# Patient Record
Sex: FEMALE | Race: WHITE | Employment: FULL TIME | URBAN - NONMETROPOLITAN AREA
[De-identification: names, ages, dates, MRNs, and addresses within clinical notes are randomized per-mention and may not be internally consistent; named-entity substitution may affect disease eponyms.]

---

## 2020-07-08 ENCOUNTER — HOSPITAL ENCOUNTER (EMERGENCY)
Age: 35
Discharge: HOME | End: 2020-07-08
Attending: EMERGENCY MEDICINE
Payer: COMMERCIAL

## 2020-07-08 VITALS
RESPIRATION RATE: 18 BRPM | BODY MASS INDEX: 45.99 KG/M2 | SYSTOLIC BLOOD PRESSURE: 151 MMHG | OXYGEN SATURATION: 98 % | HEIGHT: 67 IN | WEIGHT: 293 LBS | DIASTOLIC BLOOD PRESSURE: 93 MMHG | TEMPERATURE: 99 F | HEART RATE: 105 BPM

## 2020-07-08 PROCEDURE — 4500000028 HC INTERMEDIATE PROCEDURE

## 2020-07-08 PROCEDURE — 99282 EMERGENCY DEPT VISIT SF MDM: CPT

## 2020-07-08 PROCEDURE — 2500000003 HC RX 250 WO HCPCS

## 2020-07-08 RX ORDER — LIDOCAINE HYDROCHLORIDE 10 MG/ML
INJECTION, SOLUTION EPIDURAL; INFILTRATION; INTRACAUDAL; PERINEURAL
Status: COMPLETED
Start: 2020-07-08 | End: 2020-07-08

## 2020-07-08 RX ORDER — LIDOCAINE HYDROCHLORIDE 10 MG/ML
5 INJECTION, SOLUTION EPIDURAL; INFILTRATION; INTRACAUDAL; PERINEURAL ONCE
Status: COMPLETED | OUTPATIENT
Start: 2020-07-08 | End: 2020-07-08

## 2020-07-08 RX ORDER — DOXYCYCLINE HYCLATE 100 MG
100 TABLET ORAL 2 TIMES DAILY
Qty: 14 TABLET | Refills: 0 | Status: SHIPPED | OUTPATIENT
Start: 2020-07-08 | End: 2020-07-15

## 2020-07-08 RX ADMIN — LIDOCAINE HYDROCHLORIDE 5 ML: 10 INJECTION, SOLUTION EPIDURAL; INFILTRATION; INTRACAUDAL; PERINEURAL at 14:27

## 2020-07-08 ASSESSMENT — PAIN DESCRIPTION - LOCATION: LOCATION: LEG

## 2020-07-08 ASSESSMENT — PAIN SCALES - GENERAL: PAINLEVEL_OUTOF10: 10

## 2020-07-08 ASSESSMENT — PAIN DESCRIPTION - PAIN TYPE: TYPE: ACUTE PAIN

## 2020-07-08 ASSESSMENT — PAIN DESCRIPTION - ORIENTATION: ORIENTATION: INNER;UPPER

## 2020-07-08 NOTE — ED NOTES
Discharge instructions reviewed with patient. Medications discussed. Encouraged to take medication exactly as prescribed and until the entire antibiotic prescription is finished. Patient advised to not stop the medication even if they begin feeling better. Patient voiced understanding. Discharge instructions reviewed with patient. No additional questions asked. Voiced understanding. Encouraged patient to follow up as discussed by the ED physician.      Dahlia Chen RN  07/08/20 3283

## 2020-07-08 NOTE — ED TRIAGE NOTES
Arrived ambulatory to room 7-2 for triage. Tolerated without difficulty. Bed in lowest position. Call light given.  Gowned for exam.

## 2020-07-08 NOTE — ED PROVIDER NOTES
education level: None   Occupational History    None   Social Needs    Financial resource strain: None    Food insecurity     Worry: None     Inability: None    Transportation needs     Medical: None     Non-medical: None   Tobacco Use    Smoking status: Never Smoker    Smokeless tobacco: Never Used   Substance and Sexual Activity    Alcohol use: No    Drug use: No    Sexual activity: None   Lifestyle    Physical activity     Days per week: None     Minutes per session: None    Stress: None   Relationships    Social connections     Talks on phone: None     Gets together: None     Attends Baptism service: None     Active member of club or organization: None     Attends meetings of clubs or organizations: None     Relationship status: None    Intimate partner violence     Fear of current or ex partner: None     Emotionally abused: None     Physically abused: None     Forced sexual activity: None   Other Topics Concern    None   Social History Narrative    None         **Past medical, family and social histories, and nursing notes reviewed and verified by me**      PHYSICAL EXAM  VITAL SIGNS:   ED Triage Vitals [07/08/20 1412]   Enc Vitals Group      BP (!) 151/93      Pulse 105      Resp 18      Temp 99 °F (37.2 °C)      Temp Source Oral      SpO2 98 %      Weight 300 lb (136.1 kg)      Height 5' 7\" (1.702 m)      Head Circumference       Peak Flow       Pain Score       Pain Loc       Pain Edu? Excl. in [address]? Vitals during ED course were reviewed and are as charted. Constitutional: Minimal distress, Non-toxic appearance  Eyes: Conjunctiva normal, No discharge  HENT: Normocephalic, Atraumatic, oropharynx moist  Neck: Supple, no stridor, no grossly visible or palpable masses  Cardiovascular: Regular rate and rhythm, No murmurs, No rubs, No gallops  Pulmonary/Chest: Normal breath sounds, No respiratory distress or accessory muscle use, No wheezing, crackles or rhonchi.   Abdomen: Soft, nondistended and nonrigid, No tenderness or peritoneal signs, No masses,   Back: No midline point tenderness, No paraspinous muscle tenderness. No CVA tenderness  Extremities: No gross deformities, no edema, no tenderness  Neurologic: Normal motor function, Normal sensory function, No focal deficits  Skin: 4 cm x 4 cm area of erythema with central fluctuance on the proximal medial left thigh without crepitus, otherwise skin elsewhere is warm, Dry, No erythema, No rash, No cyanosis, No mottling  Lymphatic: No lymphadenopathy in the following location(s): Inguinal  Psychiatric: Alert and oriented x3, Affect normal        RADIOLOGY/PROCEDURES/LABS/MEDICATIONS ADMINISTERED:    I have reviewed and interpreted all of the currently available lab results from this visit (if applicable):  No results found for this visit on 07/08/20. ABNORMAL LABS:  Labs Reviewed - No data to display      IMAGING STUDIES ORDERED:  None      No orders to display         MEDICATIONS ADMINISTERED:  Medications   lidocaine PF 1 % injection 5 mL (5 mLs Intradermal Given by Other 7/8/20 1427)         Incision and Drainage Procedure Note    Indication: Abscess    Procedure: The patient was positioned appropriately and the skin over the incision site was prepped with chlorhexidine and draped in a sterile fashion. Local anesthesia was obtained by infiltration using 1% Lidocaine without epinephrine. An incision was then made over the greatest area of fluctuance and approximately 2 cc of purulent material was expressed. Loculations were not present. The drainage cavity was then irrigated and dressed with a sterile dressing. The patient tolerated the procedure well.     Complications: None          COURSE & MEDICAL DECISION MAKING  Last vitals: BP (!) 151/93   Pulse 105   Temp 99 °F (37.2 °C) (Oral)   Resp 18   Ht 5' 7\" (1.702 m)   Wt 300 lb (136.1 kg)   SpO2 98%   BMI 46.99 kg/m²     47-year-old female with abscess and cellulitis of the medial left thigh. Differential diagnoses included, but were not limited to, cellulitis, erysipelas, necrotizing fasciitis, suppurative thrombophlebitis, aseptic superficial thrombophlebitis, erythema migrans (lyme disease), contact dermatitis, lymphedema, lymphangitis, other deep space soft tissue bacterial skin infection, viral rash, systemic infectious rash, aseptic cyst, and malignancy. Abscess was incised and drained as documented above. Additional workup and treatment in the ED as documented above. Patient reassured and will be discharged home. I have explained to the patient in appropriate terminology our work-up in the ED and their diagnosis. I have also given anticipatory guidance and expectant management of their condition as an outpatient as per my custom. The patient was given clear discharge and follow-up instructions including return to the ER immediately for worsening concerns. The patient has been advised to follow-up with their primary care physician and/or referred physician in the next two to three days or sooner if worsening and to return to the ER immediately as above with any concerns. I provided the patient counseling with regard to my customary list of strict return precautions as well as return precautions specific to the cause for today's emergency department visit. The patient will return under these provided conditions, but should also return for new concerns or further worsening. Pt and/or family understand and agree with plan    Clinical Impression:  1.  Cellulitis and abscess of leg        Disposition referral (if applicable):  Lakia Wilkerson, [facility] [address] [facility]  [phone]    Schedule an appointment as soon as possible for a visit       [facility] Emergency Department  [address] [facility]  [phone]    If symptoms worsen      Disposition medications (if applicable):  Discharge Medication List as of 7/8/2020  2:36 PM

## 2020-07-08 NOTE — ED NOTES
Patient tolerated I & D without difficulty. DSD applied to wound. Reviewed wound care with patient. Voiced understanding. No additional questions asked.      Mark Ron RN  07/08/20 2183

## 2021-04-01 ENCOUNTER — HOSPITAL ENCOUNTER (EMERGENCY)
Age: 36
Discharge: HOME | End: 2021-04-01
Attending: EMERGENCY MEDICINE
Payer: COMMERCIAL

## 2021-04-01 VITALS
HEIGHT: 67 IN | DIASTOLIC BLOOD PRESSURE: 101 MMHG | WEIGHT: 293 LBS | OXYGEN SATURATION: 100 % | SYSTOLIC BLOOD PRESSURE: 159 MMHG | BODY MASS INDEX: 45.99 KG/M2 | HEART RATE: 99 BPM | RESPIRATION RATE: 18 BRPM | TEMPERATURE: 99.1 F

## 2021-04-01 DIAGNOSIS — R21 RASH: Primary | ICD-10-CM

## 2021-04-01 DIAGNOSIS — R03.0 ELEVATED BLOOD PRESSURE READING: ICD-10-CM

## 2021-04-01 PROCEDURE — 99284 EMERGENCY DEPT VISIT MOD MDM: CPT

## 2021-04-01 NOTE — ED PROVIDER NOTES
Emergency Department Encounter    Patient: Анна Hensley  MRN: [id number]  : 1985  Date of Evaluation: 2021  ED Provider:  DELORIS DYKES    Triage Chief Complaint:   Leg Pain (States went to derm. today. Has a rash and bruise on the back of her left calf. States sharp pain or pull from calf to back of knee. Legs have had pain in the off and on for a while. States not needing anything for pain. States aching when sitting or driving. Denies recent accident or injury. ) and Rash    [place]:  Анна Hensley is a 38 y.o. female that presents to the emergency room reporting an unusual rash on the back of the left calf. Patient was at a routine follow-up with her dermatologist for a \"mole check\" when the dermatologist saw the discoloration on the back of the thigh. Patient reports the area feels like a \"pulled muscle\", but there is no pain per se. There is no pain with weightbearing or movement otherwise. She denies any obvious injuries such as falls, strikes to the area, or insect bites. She denies other bruising, easy bleeding of the gums, heavy menses, dysuria, hematuria, or other similar systemic complaints. Patient reports that she tans frequently, but she does not recall getting burned in that area. Patient is concerned about blood clot, but there has been no recent travel or immobilization, history of DVT, or hormone therapy. ROS - see HPI, below listed is current ROS at time of my eval:  General:  No fevers  Musculoskeletal:  No muscle pain, no joint pain  Skin: Otherwise, no pruritis, no easy bruising  Neurologic:   no extremity numbness, no extremity tingling, no extremity weakness  Extremities:  no edema, no pain    Past Medical History:   Diagnosis Date    Bacterial meningitis     Depression      Past Surgical History:   Procedure Laterality Date    ARM SURGERY Right     TONSILLECTOMY       History reviewed. No pertinent family history.   Social History     Socioeconomic History    Marital status:      Spouse name: Not on file    Number of children: Not on file    Years of education: Not on file    Highest education level: Not on file   Occupational History    Not on file   Social Needs    Financial resource strain: Not on file    Food insecurity     Worry: Not on file     Inability: Not on file    Transportation needs     Medical: Not on file     Non-medical: Not on file   Tobacco Use    Smoking status: Never Smoker    Smokeless tobacco: Never Used   Substance and Sexual Activity    Alcohol use: Yes     Comment: Rarely    Drug use: No    Sexual activity: Not on file   Lifestyle    Physical activity     Days per week: Not on file     Minutes per session: Not on file    Stress: Not on file   Relationships    Social connections     Talks on phone: Not on file     Gets together: Not on file     Attends Voodoo service: Not on file     Active member of club or organization: Not on file     Attends meetings of clubs or organizations: Not on file     Relationship status: Not on file    Intimate partner violence     Fear of current or ex partner: Not on file     Emotionally abused: Not on file     Physically abused: Not on file     Forced sexual activity: Not on file   Other Topics Concern    Not on file   Social History Narrative    Not on file     No current facility-administered medications for this encounter. No current outpatient medications on file. Allergies   Allergen Reactions    Penicillins        Nursing Notes Reviewed    Physical Exam:  Triage VS:    ED Triage Vitals [04/01/21 1651]   Enc Vitals Group      BP (!) 188/110      Pulse 107      Resp 18      Temp 99.1 °F (37.3 °C)      Temp Source Oral      SpO2 100 %      Weight (!) 311 lb (141.1 kg)      Height 5' 7\" (1.702 m)      Head Circumference       Peak Flow       Pain Score       Pain Loc       Pain Edu? Excl. in [address]? General appearance:  No acute distress.    Skin/musculoskeletal: Focal examination of the left lower extremity reveals a somewhat serpiginous, lightly hyperpigmented eruption in a somewhat waffle-like pattern. Discoloration is not raised and does not derian with pressure. No excoriations, cellulitis, fluctuance, or crepitance. No soft tissue tenderness or induration. No bony tenderness or deformity. Warm. Dry. Ears, nose, mouth and throat:  Oral mucosa moist   Extremity: No bony tenderness or deformity. No asymmetric edema. No palpable cords or Homans signs. No swelling. Normal ROM     Perfusion:  intact          Neurological:  Alert and oriented times 3. Motor and sensory exam intact to affected extremity    I have reviewed and interpreted all of the currently available lab results from this visit (if applicable):  None indicated. Radiographs (if obtained):  Radiologist's Report Reviewed:  None obtained. MDM:  Patient presented with a skin eruption on the posterior left calf. The serpiginous, somewhat hyperpigmented appearance is suspicious for a heat reaction, but it does not appear burned or more actively inflamed such as erythema ab igne. I doubt cellulitis, abscess, fasciitis, compartment syndrome, or acute fracture or malalignment. There are no oral lesions, vesicles, bullae, or other rashes to suggest Jesse-Amadeo syndrome, erythema multiforme, or other similar hypersensitivity. Rash is inconsistent with Lyme disease or other tick based disease. Patient expressed concern about DVT, but ultrasound tech is not available at this time of day. My suspicion is low, so I do feel that outpatient follow-up the study is satisfactory. An order is provided to have the study done as soon as possible as an outpatient. Consideration is given to laboratory testing, but patient does not appear clinically anemic and does not have any other signs of easy bruising or infection.   Patient does not have any evidence of compartment syndrome, necrotizing process, deep venous thrombosis, or neurovascular deficits. Patient is also noted to have significant elevation in her blood pressure. She does report history of elevated blood pressure with her last pregnancy, but that was quite sometime ago. She denies headache, vision change, weakness, numbness, tingling, chest pain or discomfort, or abdominal pain among other considerations. As this is essentially asymptomatic, I do not believe that further laboratory testing is emergently indicated either. The patient understands that at this time there is no evidence for a more significant underlying process, and that early in a process of such an injury and initial workup can be falsely negative. Patient's symptoms will be treated symptomatically, prescriptions will be provided, they will be discharged to follow-up as an outpatient, they understand and agree with the plan, return warnings given. Clinical Impression:  1. Rash    2. Elevated blood pressure reading      Disposition referral (if applicable):  Sanjay Nunes MD  [address] [place] [address]  [phone]    Schedule an appointment as soon as possible for a visit       formerly [facility] Emergency Department  [address] [place] [address]  [phone]  Go to   As needed, If symptoms worsen    Disposition medications (if applicable):  New Prescriptions    No medications on file     ED Provider Disposition Time  DISPOSITION Decision To Discharge 04/01/2021 05:33:16 PM      Comment: Please note this report has been produced using speech recognition software and may contain errors related to that system including errors in grammar, punctuation, and spelling, as well as words and phrases that may be inappropriate. If there are any questions or concerns please feel free to contact the dictating provider for clarification.         Kaycee Moura MD  04/01/21 0807

## 2021-04-01 NOTE — ED NOTES
Patient given AVS, discharge instructions and prescriptions. Verbalizes understanding no further needs identified at this time.      Shannan Jackson RN  04/01/21 1159

## 2021-04-02 ENCOUNTER — HOSPITAL ENCOUNTER (OUTPATIENT)
Dept: ULTRASOUND IMAGING | Age: 36
Discharge: HOME | End: 2021-04-02
Payer: COMMERCIAL

## 2021-04-02 DIAGNOSIS — R21 RASH: ICD-10-CM

## 2021-04-02 PROCEDURE — 93971 EXTREMITY STUDY: CPT

## 2021-05-26 ENCOUNTER — APPOINTMENT (OUTPATIENT)
Dept: GENERAL RADIOLOGY | Age: 36
End: 2021-05-26
Payer: COMMERCIAL

## 2021-05-26 ENCOUNTER — HOSPITAL ENCOUNTER (EMERGENCY)
Age: 36
Discharge: HOME | End: 2021-05-26
Attending: EMERGENCY MEDICINE
Payer: COMMERCIAL

## 2021-05-26 VITALS
BODY MASS INDEX: 45.99 KG/M2 | WEIGHT: 293 LBS | HEIGHT: 67 IN | DIASTOLIC BLOOD PRESSURE: 72 MMHG | RESPIRATION RATE: 16 BRPM | TEMPERATURE: 100.6 F | HEART RATE: 70 BPM | OXYGEN SATURATION: 95 % | SYSTOLIC BLOOD PRESSURE: 138 MMHG

## 2021-05-26 DIAGNOSIS — U07.1 LAB TEST POSITIVE FOR DETECTION OF COVID-19 VIRUS: ICD-10-CM

## 2021-05-26 DIAGNOSIS — J18.9 MULTIFOCAL PNEUMONIA: Primary | ICD-10-CM

## 2021-05-26 LAB
ALBUMIN SERPL-MCNC: 3.6 GM/DL (ref 3.4–5)
ALP BLD-CCNC: 71 IU/L (ref 40–129)
ALT SERPL-CCNC: 30 U/L (ref 10–40)
ANION GAP SERPL CALCULATED.3IONS-SCNC: 10 MMOL/L (ref 4–16)
AST SERPL-CCNC: 30 IU/L (ref 15–37)
BASOPHILS ABSOLUTE: 0 K/CU MM
BASOPHILS RELATIVE PERCENT: 0.3 % (ref 0–1)
BILIRUB SERPL-MCNC: 0.2 MG/DL (ref 0–1)
BUN BLDV-MCNC: 11 MG/DL (ref 6–23)
CALCIUM SERPL-MCNC: 8.6 MG/DL (ref 8.3–10.6)
CHLORIDE BLD-SCNC: 102 MMOL/L (ref 99–110)
CO2: 26 MMOL/L (ref 21–32)
CREAT SERPL-MCNC: 0.8 MG/DL (ref 0.6–1.1)
DIFFERENTIAL TYPE: ABNORMAL
EOSINOPHILS ABSOLUTE: 0 K/CU MM
EOSINOPHILS RELATIVE PERCENT: 0 % (ref 0–3)
GFR AFRICAN AMERICAN: >60 ML/MIN/1.73M2
GFR NON-AFRICAN AMERICAN: >60 ML/MIN/1.73M2
GLUCOSE BLD-MCNC: 108 MG/DL (ref 70–99)
HCT VFR BLD CALC: 40.1 % (ref 37–47)
HEMOGLOBIN: 13.3 GM/DL (ref 12.5–16)
IMMATURE NEUTROPHIL %: 0.3 % (ref 0–0.43)
LYMPHOCYTES ABSOLUTE: 0.6 K/CU MM
LYMPHOCYTES RELATIVE PERCENT: 16.3 % (ref 24–44)
MCH RBC QN AUTO: 28.5 PG (ref 27–31)
MCHC RBC AUTO-ENTMCNC: 33.2 % (ref 32–36)
MCV RBC AUTO: 85.9 FL (ref 78–100)
MONOCYTES ABSOLUTE: 0.2 K/CU MM
MONOCYTES RELATIVE PERCENT: 6 % (ref 0–4)
PDW BLD-RTO: 13.7 % (ref 11.7–14.9)
PLATELET # BLD: 131 K/CU MM (ref 140–440)
PMV BLD AUTO: 10.3 FL (ref 7.5–11.1)
POTASSIUM SERPL-SCNC: 3.6 MMOL/L (ref 3.5–5.1)
RBC # BLD: 4.67 M/CU MM (ref 4.2–5.4)
SEGMENTED NEUTROPHILS ABSOLUTE COUNT: 3 K/CU MM
SEGMENTED NEUTROPHILS RELATIVE PERCENT: 77.1 % (ref 36–66)
SODIUM BLD-SCNC: 138 MMOL/L (ref 135–145)
TOTAL IMMATURE NEUTOROPHIL: 0.01 K/CU MM
TOTAL PROTEIN: 6.2 GM/DL (ref 6.4–8.2)
WBC # BLD: 3.9 K/CU MM (ref 4–10.5)

## 2021-05-26 PROCEDURE — 80053 COMPREHEN METABOLIC PANEL: CPT

## 2021-05-26 PROCEDURE — 71045 X-RAY EXAM CHEST 1 VIEW: CPT

## 2021-05-26 PROCEDURE — 2580000003 HC RX 258: Performed by: EMERGENCY MEDICINE

## 2021-05-26 PROCEDURE — 85025 COMPLETE CBC W/AUTO DIFF WBC: CPT

## 2021-05-26 PROCEDURE — 99285 EMERGENCY DEPT VISIT HI MDM: CPT

## 2021-05-26 PROCEDURE — 96372 THER/PROPH/DIAG INJ SC/IM: CPT

## 2021-05-26 PROCEDURE — 6360000002 HC RX W HCPCS: Performed by: EMERGENCY MEDICINE

## 2021-05-26 PROCEDURE — 96365 THER/PROPH/DIAG IV INF INIT: CPT

## 2021-05-26 PROCEDURE — 96375 TX/PRO/DX INJ NEW DRUG ADDON: CPT

## 2021-05-26 RX ORDER — ESCITALOPRAM OXALATE 10 MG/1
10 TABLET ORAL DAILY
COMMUNITY

## 2021-05-26 RX ORDER — ONDANSETRON 2 MG/ML
4 INJECTION INTRAMUSCULAR; INTRAVENOUS ONCE
Status: COMPLETED | OUTPATIENT
Start: 2021-05-26 | End: 2021-05-26

## 2021-05-26 RX ORDER — DEXAMETHASONE SODIUM PHOSPHATE 10 MG/ML
8 INJECTION, SOLUTION INTRAMUSCULAR; INTRAVENOUS ONCE
Status: COMPLETED | OUTPATIENT
Start: 2021-05-26 | End: 2021-05-26

## 2021-05-26 RX ORDER — 0.9 % SODIUM CHLORIDE 0.9 %
1000 INTRAVENOUS SOLUTION INTRAVENOUS ONCE
Status: COMPLETED | OUTPATIENT
Start: 2021-05-26 | End: 2021-05-26

## 2021-05-26 RX ORDER — AZITHROMYCIN 500 MG/1
500 INJECTION, POWDER, LYOPHILIZED, FOR SOLUTION INTRAVENOUS ONCE
Status: DISCONTINUED | OUTPATIENT
Start: 2021-05-26 | End: 2021-05-26 | Stop reason: CLARIF

## 2021-05-26 RX ORDER — ALBUTEROL SULFATE 90 UG/1
2 AEROSOL, METERED RESPIRATORY (INHALATION) 4 TIMES DAILY PRN
Qty: 1 INHALER | Refills: 0 | Status: SHIPPED | OUTPATIENT
Start: 2021-05-26

## 2021-05-26 RX ORDER — AZITHROMYCIN 250 MG/1
TABLET, FILM COATED ORAL
Qty: 1 PACKET | Refills: 0 | Status: ON HOLD | OUTPATIENT
Start: 2021-05-26 | End: 2021-06-02 | Stop reason: HOSPADM

## 2021-05-26 RX ADMIN — ONDANSETRON 4 MG: 2 INJECTION INTRAMUSCULAR; INTRAVENOUS at 21:29

## 2021-05-26 RX ADMIN — DEXAMETHASONE SODIUM PHOSPHATE 8 MG: 10 INJECTION, SOLUTION INTRAMUSCULAR; INTRAVENOUS at 21:34

## 2021-05-26 RX ADMIN — AZITHROMYCIN DIHYDRATE 500 MG: 500 INJECTION, POWDER, LYOPHILIZED, FOR SOLUTION INTRAVENOUS at 22:19

## 2021-05-26 RX ADMIN — SODIUM CHLORIDE 1000 ML: 9 INJECTION, SOLUTION INTRAVENOUS at 21:28

## 2021-05-27 ASSESSMENT — ENCOUNTER SYMPTOMS
SORE THROAT: 1
EYES NEGATIVE: 1
COUGH: 1
SINUS PAIN: 1
SWOLLEN GLANDS: 0
GASTROINTESTINAL NEGATIVE: 1
RHINORRHEA: 1
WHEEZING: 0

## 2021-05-27 NOTE — ED NOTES
Dr Kendall Alvarado in to discuss test results and plan to discharge.      Yuliya Jarrell RN  05/26/21 0098

## 2021-05-27 NOTE — ED NOTES
Discharge instructions and prescriptions reviewed with pt and verbalizes understanding.      Yessi Hyatt RN  05/26/21 8787

## 2021-05-27 NOTE — ED PROVIDER NOTES
The history is provided by the patient. URI  Presenting symptoms: congestion, cough, fever, rhinorrhea and sore throat    Severity:  Moderate  Timing:  Constant  Progression:  Unchanged  Chronicity:  New  Relieved by:  Nothing  Worsened by:  Nothing  Ineffective treatments:  None tried  Associated symptoms: arthralgias, sinus pain and sneezing    Associated symptoms: no headaches, no myalgias, no neck pain, no swollen glands and no wheezing    She was positive for COVID last week. states continues with fever. states very tired and exhausted. vomited X 1. nasal congestion and cough. cough is nonproductive. states only short of breath with exertion. states  wanted her to come here    Review of Systems   Constitutional: Positive for fever. HENT: Positive for congestion, rhinorrhea, sinus pain, sneezing and sore throat. Eyes: Negative. Respiratory: Positive for cough. Negative for wheezing. Cardiovascular: Negative. Gastrointestinal: Negative. Genitourinary: Negative. Musculoskeletal: Positive for arthralgias. Negative for myalgias and neck pain. Skin: Negative. Neurological: Negative. Negative for headaches. All other systems reviewed and are negative. History reviewed. No pertinent family history.   Social History     Socioeconomic History    Marital status:      Spouse name: Not on file    Number of children: Not on file    Years of education: Not on file    Highest education level: Not on file   Occupational History    Not on file   Tobacco Use    Smoking status: Never Smoker    Smokeless tobacco: Never Used   Vaping Use    Vaping Use: Former   Substance and Sexual Activity    Alcohol use: Yes     Comment: Rarely    Drug use: No    Sexual activity: Not on file   Other Topics Concern    Not on file   Social History Narrative    Not on file     Social Determinants of Health     Financial Resource Strain:     Difficulty of Paying Living Expenses:    Food Insecurity:     Worried About Running Out of Food in the Last Year:     [address] in the Last Year:    Transportation Needs:     Lack of Transportation (Medical):  Lack of Transportation (Non-Medical):    Physical Activity:     Days of Exercise per Week:     Minutes of Exercise per Session:    Stress:     Feeling of Stress :    Social Connections:     Frequency of Communication with Friends and Family:     Frequency of Social Gatherings with Friends and Family:     Attends Sabianist Services:     Active Member of Clubs or Organizations:     Attends Club or Organization Meetings:     Marital Status:    Intimate Partner Violence:     Fear of Current or Ex-Partner:     Emotionally Abused:     Physically Abused:     Sexually Abused:      Past Surgical History:   Procedure Laterality Date    ARM SURGERY Right     TONSILLECTOMY       Past Medical History:   Diagnosis Date    Bacterial meningitis     COVID-19     Depression      Allergies   Allergen Reactions    Penicillins      Prior to Admission medications    Medication Sig Start Date End Date Taking? Authorizing Provider   escitalopram (LEXAPRO) 10 MG tablet Take 10 mg by mouth daily   Yes Historical Provider, MD   albuterol sulfate HFA (VENTOLIN HFA) 108 (90 Base) MCG/ACT inhaler Inhale 2 puffs into the lungs 4 times daily as needed for Wheezing or Shortness of Breath 5/26/21  Yes Hamida Max DO   azithromycin (ZITHROMAX) 250 MG tablet Take 2 tablets (500 mg) on Day 1, followed by 1 tablet (250 mg) once daily on Days 2 through 5. 5/26/21 6/5/21 Yes Hamida Max DO       /72   Pulse 70   Temp 100.6 °F (38.1 °C) (Oral)   Resp 16   Ht 5' 7\" (1.702 m)   Wt (!) 315 lb (142.9 kg)   LMP 05/23/2021   SpO2 95%   BMI 49.34 kg/m²     Physical Exam  Constitutional:       General: She is not in acute distress. Appearance: Normal appearance. She is ill-appearing. She is not toxic-appearing or diaphoretic.    HENT: up with [facility] for recheck The patient  was also told to return to the emergency department if any changes or any concern. Patient  questions and concerns from this visit have been addressed prior to discharge. Patient was  prescribed medications. The medication(s) use,  medication(s) safety and medication(s) interactions with already prescribed medication(s) have been explained and outlined for this encounter. The patient  was educated that it is their responsibility to verify this information is correct at the time of discharge and to contact this department of any complications with the pharmacy providing this medication(s) or if their any difficulty in obtaining this medication(s). Final Impression    1. Multifocal pneumonia    2.  Lab test positive for detection of COVID-19 virus              [address],   05/27/21 0017

## 2021-05-30 ENCOUNTER — HOSPITAL ENCOUNTER (EMERGENCY)
Age: 36
Discharge: ANOTHER HOSPITAL | End: 2021-05-30
Attending: EMERGENCY MEDICINE
Payer: COMMERCIAL

## 2021-05-30 ENCOUNTER — APPOINTMENT (OUTPATIENT)
Dept: GENERAL RADIOLOGY | Age: 36
End: 2021-05-30
Payer: COMMERCIAL

## 2021-05-30 ENCOUNTER — HOSPITAL ENCOUNTER (INPATIENT)
Age: 36
LOS: 3 days | Discharge: HOME | DRG: 177 | End: 2021-06-02
Attending: INTERNAL MEDICINE | Admitting: INTERNAL MEDICINE
Payer: COMMERCIAL

## 2021-05-30 VITALS
BODY MASS INDEX: 45.99 KG/M2 | OXYGEN SATURATION: 94 % | RESPIRATION RATE: 20 BRPM | HEART RATE: 96 BPM | WEIGHT: 293 LBS | TEMPERATURE: 99.5 F | HEIGHT: 67 IN

## 2021-05-30 DIAGNOSIS — J12.9 VIRAL PNEUMONIA: Primary | ICD-10-CM

## 2021-05-30 DIAGNOSIS — U07.1 COVID-19 VIRUS INFECTION: ICD-10-CM

## 2021-05-30 PROBLEM — J96.90 RESPIRATORY FAILURE (HCC): Status: ACTIVE | Noted: 2021-05-30

## 2021-05-30 LAB
ALBUMIN SERPL-MCNC: 3.8 GM/DL (ref 3.4–5)
ALP BLD-CCNC: 70 IU/L (ref 40–129)
ALT SERPL-CCNC: 41 U/L (ref 10–40)
ANION GAP SERPL CALCULATED.3IONS-SCNC: 7 MMOL/L (ref 4–16)
AST SERPL-CCNC: 40 IU/L (ref 15–37)
BASE EXCESS MIXED: 1 (ref 0–2.3)
BASE EXCESS: ABNORMAL (ref 0–2.4)
BASOPHILS ABSOLUTE: 0 K/CU MM
BASOPHILS RELATIVE PERCENT: 0 % (ref 0–1)
BILIRUB SERPL-MCNC: 0.3 MG/DL (ref 0–1)
BUN BLDV-MCNC: 9 MG/DL (ref 6–23)
CALCIUM SERPL-MCNC: 8.9 MG/DL (ref 8.3–10.6)
CHLORIDE BLD-SCNC: 102 MMOL/L (ref 99–110)
CO2 CONTENT: 26 MMOL/L (ref 19–24)
CO2: 29 MMOL/L (ref 21–32)
CREAT SERPL-MCNC: 0.7 MG/DL (ref 0.6–1.1)
DIFFERENTIAL TYPE: ABNORMAL
EOSINOPHILS ABSOLUTE: 0 K/CU MM
EOSINOPHILS RELATIVE PERCENT: 0.2 % (ref 0–3)
GFR AFRICAN AMERICAN: >60 ML/MIN/1.73M2
GFR NON-AFRICAN AMERICAN: >60 ML/MIN/1.73M2
GLUCOSE BLD-MCNC: 95 MG/DL (ref 70–99)
HCO3 VENOUS: 24.9 MMOL/L (ref 19–25)
HCT VFR BLD CALC: 52.9 % (ref 37–47)
HEMOGLOBIN: 17.3 GM/DL (ref 12.5–16)
IMMATURE NEUTROPHIL %: 0.4 % (ref 0–0.43)
LYMPHOCYTES ABSOLUTE: 0.8 K/CU MM
LYMPHOCYTES RELATIVE PERCENT: 17.9 % (ref 24–44)
MCH RBC QN AUTO: 28 PG (ref 27–31)
MCHC RBC AUTO-ENTMCNC: 32.7 % (ref 32–36)
MCV RBC AUTO: 85.7 FL (ref 78–100)
MONOCYTES ABSOLUTE: 0.4 K/CU MM
MONOCYTES RELATIVE PERCENT: 8.3 % (ref 0–4)
O2 SAT, VEN: 91.9 % (ref 50–70)
PCO2, VEN: 36.4 MMHG (ref 38–52)
PDW BLD-RTO: 14.2 % (ref 11.7–14.9)
PH VENOUS: 7.44 (ref 7.32–7.42)
PLATELET # BLD: 241 K/CU MM (ref 140–440)
PMV BLD AUTO: 9.5 FL (ref 7.5–11.1)
PO2, VEN: 60.4 MMHG (ref 28–48)
POTASSIUM SERPL-SCNC: 4.3 MMOL/L (ref 3.5–5.1)
RBC # BLD: 6.17 M/CU MM (ref 4.2–5.4)
SEGMENTED NEUTROPHILS ABSOLUTE COUNT: 3.4 K/CU MM
SEGMENTED NEUTROPHILS RELATIVE PERCENT: 73.2 % (ref 36–66)
SODIUM BLD-SCNC: 138 MMOL/L (ref 135–145)
SOURCE, BLOOD GAS: ABNORMAL
TOTAL IMMATURE NEUTOROPHIL: 0.02 K/CU MM
TOTAL PROTEIN: 6.7 GM/DL (ref 6.4–8.2)
WBC # BLD: 4.7 K/CU MM (ref 4–10.5)

## 2021-05-30 PROCEDURE — 85025 COMPLETE CBC W/AUTO DIFF WBC: CPT

## 2021-05-30 PROCEDURE — 82800 BLOOD PH: CPT

## 2021-05-30 PROCEDURE — 96374 THER/PROPH/DIAG INJ IV PUSH: CPT

## 2021-05-30 PROCEDURE — 80053 COMPREHEN METABOLIC PANEL: CPT

## 2021-05-30 PROCEDURE — 2100000000 HC CCU R&B

## 2021-05-30 PROCEDURE — 94761 N-INVAS EAR/PLS OXIMETRY MLT: CPT

## 2021-05-30 PROCEDURE — 1200000000 HC SEMI PRIVATE

## 2021-05-30 PROCEDURE — 99285 EMERGENCY DEPT VISIT HI MDM: CPT

## 2021-05-30 PROCEDURE — 6360000002 HC RX W HCPCS: Performed by: EMERGENCY MEDICINE

## 2021-05-30 PROCEDURE — 2580000003 HC RX 258: Performed by: INTERNAL MEDICINE

## 2021-05-30 PROCEDURE — 2700000000 HC OXYGEN THERAPY PER DAY

## 2021-05-30 PROCEDURE — 71045 X-RAY EXAM CHEST 1 VIEW: CPT

## 2021-05-30 PROCEDURE — 6360000002 HC RX W HCPCS: Performed by: INTERNAL MEDICINE

## 2021-05-30 RX ORDER — SODIUM CHLORIDE 0.9 % (FLUSH) 0.9 %
5-40 SYRINGE (ML) INJECTION PRN
Status: DISCONTINUED | OUTPATIENT
Start: 2021-05-30 | End: 2021-06-02 | Stop reason: HOSPADM

## 2021-05-30 RX ORDER — ONDANSETRON 2 MG/ML
4 INJECTION INTRAMUSCULAR; INTRAVENOUS EVERY 6 HOURS PRN
Status: DISCONTINUED | OUTPATIENT
Start: 2021-05-30 | End: 2021-06-02 | Stop reason: HOSPADM

## 2021-05-30 RX ORDER — DEXAMETHASONE 4 MG/1
6 TABLET ORAL DAILY
Status: DISCONTINUED | OUTPATIENT
Start: 2021-05-30 | End: 2021-06-02 | Stop reason: HOSPADM

## 2021-05-30 RX ORDER — POLYETHYLENE GLYCOL 3350 17 G/17G
17 POWDER, FOR SOLUTION ORAL DAILY PRN
Status: DISCONTINUED | OUTPATIENT
Start: 2021-05-30 | End: 2021-06-02 | Stop reason: HOSPADM

## 2021-05-30 RX ORDER — LEVOFLOXACIN 5 MG/ML
500 INJECTION, SOLUTION INTRAVENOUS EVERY 24 HOURS
Status: DISCONTINUED | OUTPATIENT
Start: 2021-05-30 | End: 2021-05-30

## 2021-05-30 RX ORDER — SODIUM CHLORIDE 9 MG/ML
25 INJECTION, SOLUTION INTRAVENOUS PRN
Status: DISCONTINUED | OUTPATIENT
Start: 2021-05-30 | End: 2021-06-02 | Stop reason: HOSPADM

## 2021-05-30 RX ORDER — PROMETHAZINE HYDROCHLORIDE 25 MG/1
12.5 TABLET ORAL EVERY 6 HOURS PRN
Status: DISCONTINUED | OUTPATIENT
Start: 2021-05-30 | End: 2021-06-02 | Stop reason: HOSPADM

## 2021-05-30 RX ORDER — LEVOFLOXACIN 5 MG/ML
750 INJECTION, SOLUTION INTRAVENOUS EVERY 24 HOURS
Status: DISCONTINUED | OUTPATIENT
Start: 2021-05-30 | End: 2021-05-31

## 2021-05-30 RX ORDER — GUAIFENESIN/DEXTROMETHORPHAN 100-10MG/5
5 SYRUP ORAL EVERY 4 HOURS PRN
Status: DISCONTINUED | OUTPATIENT
Start: 2021-05-30 | End: 2021-06-02 | Stop reason: HOSPADM

## 2021-05-30 RX ORDER — SODIUM CHLORIDE 0.9 % (FLUSH) 0.9 %
5-40 SYRINGE (ML) INJECTION EVERY 12 HOURS SCHEDULED
Status: DISCONTINUED | OUTPATIENT
Start: 2021-05-30 | End: 2021-06-02 | Stop reason: HOSPADM

## 2021-05-30 RX ORDER — ACETAMINOPHEN 650 MG/1
650 SUPPOSITORY RECTAL EVERY 6 HOURS PRN
Status: DISCONTINUED | OUTPATIENT
Start: 2021-05-30 | End: 2021-06-02 | Stop reason: HOSPADM

## 2021-05-30 RX ORDER — DEXAMETHASONE SODIUM PHOSPHATE 10 MG/ML
8 INJECTION, SOLUTION INTRAMUSCULAR; INTRAVENOUS ONCE
Status: COMPLETED | OUTPATIENT
Start: 2021-05-30 | End: 2021-05-30

## 2021-05-30 RX ORDER — ACETAMINOPHEN 325 MG/1
650 TABLET ORAL EVERY 6 HOURS PRN
Status: DISCONTINUED | OUTPATIENT
Start: 2021-05-30 | End: 2021-06-02 | Stop reason: HOSPADM

## 2021-05-30 RX ADMIN — ENOXAPARIN SODIUM 30 MG: 30 INJECTION SUBCUTANEOUS at 23:19

## 2021-05-30 RX ADMIN — DEXAMETHASONE SODIUM PHOSPHATE 8 MG: 10 INJECTION, SOLUTION INTRAMUSCULAR; INTRAVENOUS at 14:32

## 2021-05-30 RX ADMIN — SODIUM CHLORIDE, PRESERVATIVE FREE 10 ML: 5 INJECTION INTRAVENOUS at 23:19

## 2021-05-30 RX ADMIN — DEXAMETHASONE 6 MG: 4 TABLET ORAL at 23:19

## 2021-05-30 RX ADMIN — LEVOFLOXACIN 750 MG: 5 INJECTION, SOLUTION INTRAVENOUS at 23:19

## 2021-05-30 ASSESSMENT — PAIN SCALES - GENERAL
PAINLEVEL_OUTOF10: 0
PAINLEVEL_OUTOF10: 0

## 2021-05-30 NOTE — ED NOTES
Ambulated pt; SpO2-82-85% HR-121;  [address] [facility]     Khanh Agustin RN  05/30/21 2013

## 2021-05-30 NOTE — ED PROVIDER NOTES
Emergency Department Encounter  Location: [facility] [address] [place] [address]    Patient: Patricia Chang  MRN: [id number]  : 1985  Date of evaluation: 2021  ED Provider: Jonathan Ward DO, FACEP    Chief Complaint:    Shortness of Breath (pt was here a few days ago after being diagnosed with COVID. pt states was diagnosed with pneumonia. states she checks her oxygen level at home and it runs 91% at rest. when she woke up this morning it was 84% and after taking her shower it was 81%.)    [place]:  Patricia Chang is a 43 y.o. female that presents to the emergency department with complaints of shortness of breath. This patient was diagnosed with COVID-19 about 10 days ago. She states she just finished her quarantine yesterday. She was here Wednesday because of shortness of breath. The patient was noted to have pneumonia which was multifocal in nature. Patient was started on Zithromax and was given Decadron. She has been using breathing treatments from her friend. She states today she got up and she has been having oxygen saturations that have been in the low 90s 90 to 91% range however when she gets up and moves around it falls into the low 80s. After she took a shower today it was 80. She states her shortness of breath seems to be worsening. She states that she does not feel bad unless she is up moving around and her sats start to drop. She denies productive cough. She denies fever or chills at this time. She denies swelling in her legs or pain in her calves. ROS - see HPI, below listed is current ROS at time of my eval:  At least 10 systems reviewed and otherwise acutely negative except as in the [address]   General:  No fevers, no chills, no weakness  Eyes:  No recent vison changes, no discharge  ENT:  No sore throat, no nasal congestion, no hearing changes  Cardiovascular:  No chest pain, no palpitations  Respiratory: Positive for shortness of breath, no cough, no wheezing  Gastrointestinal:  No pain, no nausea, no vomiting, no diarrhea  Musculoskeletal:  No muscle pain, no joint pain  Skin:  No rash, no pruritis, no easy bruising  Neurologic:  No speech problems, no headache, no extremity numbness, no extremity tingling, no extremity weakness  Psychiatric:  No anxiety  Genitourinary:  No dysuria, no hematuria  Endocrine:  No unexpected weight gain, no unexpected weight loss  Extremities:  no edema, no pain    Past Medical History:   Diagnosis Date    Bacterial meningitis     COVID-19     Depression      Past Surgical History:   Procedure Laterality Date    ARM SURGERY Right     TONSILLECTOMY       History reviewed. No pertinent family history. Social History     Socioeconomic History    Marital status:      Spouse name: Not on file    Number of children: Not on file    Years of education: Not on file    Highest education level: Not on file   Occupational History    Not on file   Tobacco Use    Smoking status: Never Smoker    Smokeless tobacco: Never Used   Vaping Use    Vaping Use: Former   Substance and Sexual Activity    Alcohol use: Yes     Comment: Rarely    Drug use: No    Sexual activity: Not on file   Other Topics Concern    Not on file   Social History Narrative    Not on file     Social Determinants of Health     Financial Resource Strain:     Difficulty of Paying Living Expenses:    Food Insecurity:     Worried About [facility] in the Last Year:     [address]  in the Last Year:    Transportation Needs:     Lack of Transportation (Medical):      Lack of Transportation (Non-Medical):    Physical Activity:     Days of Exercise per Week:     Minutes of Exercise per Session:    Stress:     Feeling of Stress :    Social Connections:     Frequency of Communication with Friends and Family:     Frequency of Social Gatherings with Friends and Family:     Attends Voodoo Services:     Active Member of Clubs or Organizations:     Attends Club or Organization Meetings:     Marital Status:    Intimate Partner Violence:     Fear of Current or Ex-Partner:     Emotionally Abused:     Physically Abused:     Sexually Abused:      No current facility-administered medications for this encounter. Current Outpatient Medications   Medication Sig Dispense Refill    escitalopram (LEXAPRO) 10 MG tablet Take 10 mg by mouth daily      albuterol sulfate HFA (VENTOLIN HFA) 108 (90 Base) MCG/ACT inhaler Inhale 2 puffs into the lungs 4 times daily as needed for Wheezing or Shortness of Breath 1 Inhaler 0    azithromycin (ZITHROMAX) 250 MG tablet Take 2 tablets (500 mg) on Day 1, followed by 1 tablet (250 mg) once daily on Days 2 through 5. 1 packet 0     Allergies   Allergen Reactions    Penicillins        Nursing Notes Reviewed    Physical Exam:  ED Triage Vitals [05/30/21 1341]   Enc Vitals Group      BP       Pulse 100      Resp 22      Temp 99.5 °F (37.5 °C)      Temp Source Oral      SpO2 91 %      Weight (!) 315 lb (142.9 kg)      Height 5' 7\" (1.702 m)      Head Circumference       Peak Flow       Pain Score       Pain Loc       Pain Edu? Excl. in [address]? GENERAL APPEARANCE: Awake and alert. Cooperative. No acute distress. Nontoxic in appearance. At rest she seems to be comfortable. She does not have an oxygen requirement and is saturating 90 to 92% on room air  HEAD: Normocephalic. Atraumatic. EYES: EOM's grossly intact. Sclera anicteric. ENT: Tolerates saliva. No trismus. NECK: Supple. Trachea midline. CARDIO: RRR. Radial pulse 2+. LUNGS: Respirations unlabored. Decreased breath sounds bilaterally. She is able to talk in full sentences  ABDOMEN: Soft. Non-distended. Non-tender. EXTREMITIES: No acute deformities. Calves are nontender to palpation. Legs are not swollen. SKIN: Warm and dry. NEUROLOGICAL: No gross facial drooping. Moves all 4 extremities spontaneously.    PSYCHIATRIC: Normal mood.     Labs:  Results for orders placed or performed during the hospital encounter of 05/30/21   CBC Auto Differential   Result Value Ref Range    WBC 4.7 4.0 - 10.5 K/CU MM    RBC 6.17 (H) 4.2 - 5.4 M/CU MM    Hemoglobin 17.3 (H) 12.5 - 16.0 GM/DL    Hematocrit 52.9 (H) 37 - 47 %    MCV 85.7 78 - 100 FL    MCH 28.0 27 - 31 PG    MCHC 32.7 32.0 - 36.0 %    RDW 14.2 11.7 - 14.9 %    Platelets 367 766 - 934 K/CU MM    MPV 9.5 7.5 - 11.1 FL    Differential Type AUTOMATED DIFFERENTIAL     Segs Relative 73.2 (H) 36 - 66 %    Lymphocytes % 17.9 (L) 24 - 44 %    Monocytes % 8.3 (H) 0 - 4 %    Eosinophils % 0.2 0 - 3 %    Basophils % 0.0 0 - 1 %    Segs Absolute 3.4 K/CU MM    Lymphocytes Absolute 0.8 K/CU MM    Monocytes Absolute 0.4 K/CU MM    Eosinophils Absolute 0.0 K/CU MM    Basophils Absolute 0.0 K/CU MM    Immature Neutrophil % 0.4 0 - 0.43 %    Total Immature Neutrophil 0.02 K/CU MM   Comprehensive Metabolic Panel   Result Value Ref Range    Sodium 138 135 - 145 MMOL/L    Potassium 4.3 3.5 - 5.1 MMOL/L    Chloride 102 99 - 110 mMol/L    CO2 29 21 - 32 MMOL/L    BUN 9 6 - 23 MG/DL    CREATININE 0.7 0.6 - 1.1 MG/DL    Glucose 95 70 - 99 MG/DL    Calcium 8.9 8.3 - 10.6 MG/DL    Albumin 3.8 3.4 - 5.0 GM/DL    Total Protein 6.7 6.4 - 8.2 GM/DL    Total Bilirubin 0.3 0.0 - 1.0 MG/DL    ALT 41 (H) 10 - 40 U/L    AST 40 (H) 15 - 37 IU/L    Alkaline Phosphatase 70 40 - 129 IU/L    GFR Non-African American >60 >60 mL/min/1.73m2    GFR African American >60 >60 mL/min/1.73m2    Anion Gap 7 4 - 16   POCT Venous   Result Value Ref Range    pH, Joel 7.44 (H) 7.32 - 7.42    pCO2, Joel 36.4 (L) 38 - 52 mmHG    pO2, Joel 60.4 (H) 28 - 48 mmHG    Base Exc, Mixed 1.0 0 - 2.3    Base Excess HIDE 0 - 2.4    HCO3, Venous 24.9 19 - 25 MMOL/L    O2 Sat, Joel 91.9 (H) 50 - 70 %    CO2 Content 26.0 (H) 19 - 24 MMOL/L    Source: Venous            Radiographs (if obtained):  [] The following radiograph was interpreted by myself in the absence of a radiologist:  [x] Radiologist's Report reviewed at time of ED visit:  XR CHEST PORTABLE   Final Result   Increasing bilateral airspace opacities suggesting worsening COVID-19   pneumonia           The total Critical Care time is 25 minutes which excludes separately billable procedures. ED Course and MDM:  This patient's O2 sat dropped to 80% when she walked to the bathroom. she has an oxygen requirement with activity in her chest x-ray looks worse than it did last Wednesday. Relative to this I am recommending admission for viral pneumonia and hypoxia. I discussed her case with Dr. Carlos Reyez who has agreed to admit this patient to the hospitalist service to the [facility] unit in [facility]. She will be transferred once a bed is available and unit is available to take her to [facility] for continued evaluation and treatment. Patient is agreeable to the transfer at this time. Final Impression:  1. Viral pneumonia    2. COVID-19 virus infection      DISPOSITION Decision To Transfer    Patient referred to: No follow-up provider specified.   Discharge medications:  New Prescriptions    No medications on file     (Please note that portions of this note may have been completed with a voice recognition program. Efforts were made to edit the dictations but occasionally words are mis-transcribed.)    Jonathan Ward DO, [facility]  Board certified in [id number]  05/30/21 RudlophKaplanDO  06/23/21 0700

## 2021-05-31 LAB
ALBUMIN SERPL-MCNC: 3.9 GM/DL (ref 3.4–5)
ALBUMIN SERPL-MCNC: 3.9 GM/DL (ref 3.4–5)
ALP BLD-CCNC: 65 IU/L (ref 40–129)
ALP BLD-CCNC: 67 IU/L (ref 40–129)
ALT SERPL-CCNC: 45 U/L (ref 10–40)
ALT SERPL-CCNC: 46 U/L (ref 10–40)
ANION GAP SERPL CALCULATED.3IONS-SCNC: 11 MMOL/L (ref 4–16)
ANION GAP SERPL CALCULATED.3IONS-SCNC: 12 MMOL/L (ref 4–16)
APTT: 34.2 SECONDS (ref 25.1–37.1)
AST SERPL-CCNC: 34 IU/L (ref 15–37)
AST SERPL-CCNC: 37 IU/L (ref 15–37)
BASOPHILS ABSOLUTE: 0 K/CU MM
BASOPHILS ABSOLUTE: 0 K/CU MM
BASOPHILS RELATIVE PERCENT: 0 % (ref 0–1)
BASOPHILS RELATIVE PERCENT: 0 % (ref 0–1)
BILIRUB SERPL-MCNC: 0.3 MG/DL (ref 0–1)
BILIRUB SERPL-MCNC: 0.3 MG/DL (ref 0–1)
BUN BLDV-MCNC: 8 MG/DL (ref 6–23)
BUN BLDV-MCNC: 9 MG/DL (ref 6–23)
CALCIUM SERPL-MCNC: 8.6 MG/DL (ref 8.3–10.6)
CALCIUM SERPL-MCNC: 8.6 MG/DL (ref 8.3–10.6)
CHLORIDE BLD-SCNC: 97 MMOL/L (ref 99–110)
CHLORIDE BLD-SCNC: 98 MMOL/L (ref 99–110)
CO2: 23 MMOL/L (ref 21–32)
CO2: 23 MMOL/L (ref 21–32)
CREAT SERPL-MCNC: 0.5 MG/DL (ref 0.6–1.1)
CREAT SERPL-MCNC: 0.5 MG/DL (ref 0.6–1.1)
D DIMER: 468 NG/ML(DDU)
DIFFERENTIAL TYPE: ABNORMAL
DIFFERENTIAL TYPE: ABNORMAL
EOSINOPHILS ABSOLUTE: 0 K/CU MM
EOSINOPHILS ABSOLUTE: 0 K/CU MM
EOSINOPHILS RELATIVE PERCENT: 0 % (ref 0–3)
EOSINOPHILS RELATIVE PERCENT: 0 % (ref 0–3)
FERRITIN: 464 NG/ML (ref 15–150)
FIBRINOGEN LEVEL: 629 MG/DL (ref 196.9–442.1)
GFR AFRICAN AMERICAN: >60 ML/MIN/1.73M2
GFR AFRICAN AMERICAN: >60 ML/MIN/1.73M2
GFR NON-AFRICAN AMERICAN: >60 ML/MIN/1.73M2
GFR NON-AFRICAN AMERICAN: >60 ML/MIN/1.73M2
GLUCOSE BLD-MCNC: 183 MG/DL (ref 70–99)
GLUCOSE BLD-MCNC: 194 MG/DL (ref 70–99)
HCT VFR BLD CALC: 37.4 % (ref 37–47)
HCT VFR BLD CALC: 38.4 % (ref 37–47)
HEMOGLOBIN: 12.5 GM/DL (ref 12.5–16)
HEMOGLOBIN: 12.6 GM/DL (ref 12.5–16)
HIGH SENSITIVE C-REACTIVE PROTEIN: 133.8 MG/L
IMMATURE NEUTROPHIL %: 0.6 % (ref 0–0.43)
IMMATURE NEUTROPHIL %: 0.6 % (ref 0–0.43)
INR BLD: 1.1 INDEX
LACTATE DEHYDROGENASE: 306 IU/L (ref 120–246)
LACTATE: 0.8 MMOL/L (ref 0.4–2)
LEGIONELLA URINARY AG: NEGATIVE
LYMPHOCYTES ABSOLUTE: 0.4 K/CU MM
LYMPHOCYTES ABSOLUTE: 0.4 K/CU MM
LYMPHOCYTES RELATIVE PERCENT: 11.7 % (ref 24–44)
LYMPHOCYTES RELATIVE PERCENT: 13.5 % (ref 24–44)
MCH RBC QN AUTO: 27.7 PG (ref 27–31)
MCH RBC QN AUTO: 28.6 PG (ref 27–31)
MCHC RBC AUTO-ENTMCNC: 32.6 % (ref 32–36)
MCHC RBC AUTO-ENTMCNC: 33.7 % (ref 32–36)
MCV RBC AUTO: 84.8 FL (ref 78–100)
MCV RBC AUTO: 85 FL (ref 78–100)
MONOCYTES ABSOLUTE: 0.1 K/CU MM
MONOCYTES ABSOLUTE: 0.2 K/CU MM
MONOCYTES RELATIVE PERCENT: 4.5 % (ref 0–4)
MONOCYTES RELATIVE PERCENT: 4.5 % (ref 0–4)
NUCLEATED RBC %: 0 %
NUCLEATED RBC %: 0 %
PDW BLD-RTO: 13.7 % (ref 11.7–14.9)
PDW BLD-RTO: 13.8 % (ref 11.7–14.9)
PLATELET # BLD: 206 K/CU MM (ref 140–440)
PLATELET # BLD: 214 K/CU MM (ref 140–440)
PMV BLD AUTO: 9.6 FL (ref 7.5–11.1)
PMV BLD AUTO: 9.7 FL (ref 7.5–11.1)
POTASSIUM SERPL-SCNC: 4.4 MMOL/L (ref 3.5–5.1)
POTASSIUM SERPL-SCNC: 4.5 MMOL/L (ref 3.5–5.1)
PROCALCITONIN: 0.06
PROTHROMBIN TIME: 13.3 SECONDS (ref 11.7–14.5)
RBC # BLD: 4.41 M/CU MM (ref 4.2–5.4)
RBC # BLD: 4.52 M/CU MM (ref 4.2–5.4)
SEGMENTED NEUTROPHILS ABSOLUTE COUNT: 2.5 K/CU MM
SEGMENTED NEUTROPHILS ABSOLUTE COUNT: 2.8 K/CU MM
SEGMENTED NEUTROPHILS RELATIVE PERCENT: 81.4 % (ref 36–66)
SEGMENTED NEUTROPHILS RELATIVE PERCENT: 83.2 % (ref 36–66)
SODIUM BLD-SCNC: 132 MMOL/L (ref 135–145)
SODIUM BLD-SCNC: 132 MMOL/L (ref 135–145)
STREP PNEUMONIAE ANTIGEN: NORMAL
TOTAL IMMATURE NEUTOROPHIL: 0.02 K/CU MM
TOTAL IMMATURE NEUTOROPHIL: 0.02 K/CU MM
TOTAL NUCLEATED RBC: 0 K/CU MM
TOTAL NUCLEATED RBC: 0 K/CU MM
TOTAL PROTEIN: 6 GM/DL (ref 6.4–8.2)
TOTAL PROTEIN: 6.2 GM/DL (ref 6.4–8.2)
TROPONIN T: <0.01 NG/ML
VITAMIN D 25-HYDROXY: 37.56 NG/ML
WBC # BLD: 3.1 K/CU MM (ref 4–10.5)
WBC # BLD: 3.3 K/CU MM (ref 4–10.5)

## 2021-05-31 PROCEDURE — 84484 ASSAY OF TROPONIN QUANT: CPT

## 2021-05-31 PROCEDURE — 80053 COMPREHEN METABOLIC PANEL: CPT

## 2021-05-31 PROCEDURE — 85730 THROMBOPLASTIN TIME PARTIAL: CPT

## 2021-05-31 PROCEDURE — 85384 FIBRINOGEN ACTIVITY: CPT

## 2021-05-31 PROCEDURE — 85379 FIBRIN DEGRADATION QUANT: CPT

## 2021-05-31 PROCEDURE — 85610 PROTHROMBIN TIME: CPT

## 2021-05-31 PROCEDURE — 94761 N-INVAS EAR/PLS OXIMETRY MLT: CPT

## 2021-05-31 PROCEDURE — 87899 AGENT NOS ASSAY W/OPTIC: CPT

## 2021-05-31 PROCEDURE — 2100000000 HC CCU R&B

## 2021-05-31 PROCEDURE — 82728 ASSAY OF FERRITIN: CPT

## 2021-05-31 PROCEDURE — 2700000000 HC OXYGEN THERAPY PER DAY

## 2021-05-31 PROCEDURE — 83615 LACTATE (LD) (LDH) ENZYME: CPT

## 2021-05-31 PROCEDURE — 87449 NOS EACH ORGANISM AG IA: CPT

## 2021-05-31 PROCEDURE — 86141 C-REACTIVE PROTEIN HS: CPT

## 2021-05-31 PROCEDURE — 2580000003 HC RX 258: Performed by: INTERNAL MEDICINE

## 2021-05-31 PROCEDURE — 85025 COMPLETE CBC W/AUTO DIFF WBC: CPT

## 2021-05-31 PROCEDURE — 83605 ASSAY OF LACTIC ACID: CPT

## 2021-05-31 PROCEDURE — 1200000000 HC SEMI PRIVATE

## 2021-05-31 PROCEDURE — 6360000002 HC RX W HCPCS: Performed by: INTERNAL MEDICINE

## 2021-05-31 PROCEDURE — 84145 PROCALCITONIN (PCT): CPT

## 2021-05-31 PROCEDURE — 82306 VITAMIN D 25 HYDROXY: CPT

## 2021-05-31 RX ADMIN — ENOXAPARIN SODIUM 30 MG: 30 INJECTION SUBCUTANEOUS at 08:29

## 2021-05-31 RX ADMIN — SODIUM CHLORIDE, PRESERVATIVE FREE 10 ML: 5 INJECTION INTRAVENOUS at 21:30

## 2021-05-31 RX ADMIN — AZITHROMYCIN MONOHYDRATE 500 MG: 500 INJECTION, POWDER, LYOPHILIZED, FOR SOLUTION INTRAVENOUS at 16:32

## 2021-05-31 RX ADMIN — DEXAMETHASONE 6 MG: 4 TABLET ORAL at 08:29

## 2021-05-31 RX ADMIN — SODIUM CHLORIDE, PRESERVATIVE FREE 10 ML: 5 INJECTION INTRAVENOUS at 08:31

## 2021-05-31 RX ADMIN — ENOXAPARIN SODIUM 30 MG: 30 INJECTION SUBCUTANEOUS at 21:29

## 2021-05-31 ASSESSMENT — PAIN SCALES - GENERAL
PAINLEVEL_OUTOF10: 0

## 2021-05-31 NOTE — PROGRESS NOTES
Hospitalist Progress Note      Name:  Vero Patel /Age/Sex: 1985  (43 y.o. female)   MRN & CSN:  [id number] & [id number] Admission Date/Time: 2021  8:09 PM   Location:  -A PCP: Agusto Shay MD         Hospital Day: 2    ASSESSMENT & PLAN:  Vero Patel is a 43 y.o.  female  who presented to the hospital with a complaint of shortness of breath. Patient initially tested for COVID-19 on 2021 and again on 2021. Blood test results came back positive. At the time of the testing, the patient was not symptomatic but her employer requested her to be tested since her son tested positive for Covid and had symptoms. Patient developed symptoms 4 to 5 days later after getting tested positive for COVID-19. Patient admitted to the hospital for acute hypoxic respiratory failure with SPO2 in the 80s with exertion. #.  Acute hypoxic respiratory failure & COVID-19 pneumonia---D-dimer mildly elevated at 468. Procalcitonin low at 0.059. Afebrile. No leukocytosis. Empirically treated with Levaquin on admission. Superimposed bacterial infection less likely. Discussed the discharge with the patient on  but did not felt comfortable going home today.  -Likely discharge tomorrow if she continues to remain in stable  -Continue Decadron  -DC Levaquin  -Daily CBC and CMP    #. Morbid obesity---312 LBS. BMI 48.96      Diet DIET GENERAL;   DVT Prophylaxis [x] Lovenox, []  Heparin, [] SCDs, [] Ambulation   GI Prophylaxis [] PPI,  [] H2 Blocker,  [] Carafate,  [] Diet/Tube Feeds   Code Status Full Code   Disposition  Home   MDM [] Low, [] Moderate,[x]  High     Chief complaint/Interval History/ROS     Chief Complaint: Shortness of breath    INTERVAL HISTORY:    : Overall stable. Requiring 1 to 2 L of oxygen at rest.     ROS:  No chest pain. no abdominal pain. No nausea or vomiting. Objective:        Intake/Output Summary (Last 24 hours) at 2021 1208  Last data filed at 5/31/2021 0050  Gross per 24 hour   Intake 150 ml   Output --   Net 150 ml      Vitals:   Vitals:    05/31/21 1100   BP:    Pulse: 87   Resp: 20   Temp:    SpO2: 92%     Physical Exam:   GEN: Awake female, sitting upright in bed. Nontoxic-appearing. Pleasant. EYES: No eye discharge. HENT: Mucous membranes moist.  No nasal discharge. Normocephalic/atraumatic. NECK: Supple  RESP: Symmetric breath sounds. No accessory muscle use. Symmetric chest expansion. CV: Regular rate and rhythm. GI: Obese abdomen. Soft. : No Jj in place. MSK: No bony fractures. No gross deformities. SKIN: warm, dry, no rashes  NEURO: Cranial nerves appear grossly intact, normal speech, no lateralizing weakness. PSYCH: Awake, alert, oriented x 4.      Medications:   Medications:    sodium chloride flush  5-40 mL Intravenous 2 times per day    enoxaparin  30 mg Subcutaneous BID    dexamethasone  6 mg Oral Daily    levofloxacin  750 mg Intravenous Q24H      Infusions:    sodium chloride       PRN Meds: sodium chloride flush, 5-40 mL, PRN  sodium chloride, 25 mL, PRN  promethazine, 12.5 mg, Q6H PRN   Or  ondansetron, 4 mg, Q6H PRN  polyethylene glycol, 17 g, Daily PRN  acetaminophen, 650 mg, Q6H PRN   Or  acetaminophen, 650 mg, Q6H PRN  guaiFENesin-dextromethorphan, 5 mL, Q4H PRN      Electronically signed by Paige Alonso MD on 5/31/2021 at 12:08 PM

## 2021-05-31 NOTE — CONSULTS
Subjective:   CHIEF COMPLAINT / HPI:  43year old female who is covid positive since 5./19/21. she is admitted with increasing sob. She has mild cough and wheeze. She denie sany sputum production.       Past Medical History:  Past Medical History:   Diagnosis Date    Bacterial meningitis     COVID-19     Depression        Past Surgical History:        Procedure Laterality Date    ARM SURGERY Right     TONSILLECTOMY         Current Medications:    Current Facility-Administered Medications: sodium chloride flush 0.9 % injection 5-40 mL, 5-40 mL, Intravenous, 2 times per day  sodium chloride flush 0.9 % injection 5-40 mL, 5-40 mL, Intravenous, PRN  0.9 % sodium chloride infusion, 25 mL, Intravenous, PRN  enoxaparin (LOVENOX) injection 30 mg, 30 mg, Subcutaneous, BID  promethazine (PHENERGAN) tablet 12.5 mg, 12.5 mg, Oral, Q6H PRN **OR** ondansetron (ZOFRAN) injection 4 mg, 4 mg, Intravenous, Q6H PRN  polyethylene glycol (GLYCOLAX) packet 17 g, 17 g, Oral, Daily PRN  acetaminophen (TYLENOL) tablet 650 mg, 650 mg, Oral, Q6H PRN **OR** acetaminophen (TYLENOL) suppository 650 mg, 650 mg, Rectal, Q6H PRN  guaiFENesin-dextromethorphan (ROBITUSSIN DM) 100-10 MG/5ML syrup 5 mL, 5 mL, Oral, Q4H PRN  dexamethasone (DECADRON) tablet 6 mg, 6 mg, Oral, Daily  levoFLOXacin (LEVAQUIN) 750 MG/150ML infusion 750 mg, 750 mg, Intravenous, Q24H    Allergies   Allergen Reactions    Penicillins        Social History:    Social History     Socioeconomic History    Marital status:      Spouse name: None    Number of children: None    Years of education: None    Highest education level: None   Occupational History    None   Tobacco Use    Smoking status: Never Smoker    Smokeless tobacco: Never Used   Vaping Use    Vaping Use: Former   Substance and Sexual Activity    Alcohol use: Yes     Comment: Rarely    Drug use: No    Sexual activity: None   Other Topics Concern    None   Social History Narrative    None changes and diabetic symptoms including polyuria, polydipsia and polyphagia    MUSCULOSKELETAL:  negative for  pain, joint swelling, decreased range of motion and muscle weakness  NEUROLOGICAL:  negative for headaches, slurred speech, unilateral weakness  PSYCHIATRIC/BEHAVIORAL: negative for hallucinations, behavioral problems, confusion and agitation. Objective:   PHYSICAL EXAM:      VITALS:    Vitals:    05/31/21 0822 05/31/21 0900 05/31/21 1000 05/31/21 1100   BP: (!) 148/91      Pulse: 83 75 79 87   Resp: 20 22 17 20   Temp: 98.6 °F (37 °C)      TempSrc: Oral      SpO2: 92% 93% 93% 92%   Weight:       Height:             CONSTITUTIONAL:  awake, alert, cooperative, no apparent distress, and appears stated age  NECK:  Supple, symmetrical, trachea midline, no adenopathy, thyroid symmetric, not enlarged and no tenderness  CHEST: Chest expansion equal and symmetrical, no intercostal retraction. LUNGS:  no increased work of breathing, has expiratory wheezes both lungs, no crackles. CARDIOVASCULAR: S1 and S2, no edema and no JVD  ABDOMEN:  normal bowel sounds, non-distended and no masses palpated, and no tenderness to palpation. No hepatospleenomegaly  LYMPHADENOPATHY:  no axillary or supraclavicular adenopathy. No cervical adnenopathy  PSYCHIATRIC: Oriented to person place and time. No obvious depression or anxiety. MUSCULOSKELETAL: No obvious misalignment or effusion of the joints. No clubbing, cyanosis of the digits. RIGHT AND LEFT LOWER EXTREMITIES: No edema, no inflammation, no tenderness. SKIN:  normal skin color, texture, turgor and no redness, warmth, or swelling.  No palpable nodules    DATA:       EXAMINATION:   ONE XRAY VIEW OF THE CHEST       5/30/2021 1:58 pm       COMPARISON:   05/26/2021       HISTORY:   ORDERING SYSTEM PROVIDED HISTORY: Shortness of breath, positive for Covid   TECHNOLOGIST PROVIDED HISTORY:   Reason for exam:->Shortness of breath, positive for Covid   Reason for Exam: Shortness of breath, positive for Covid   Acuity: Acute   Type of Exam: Initial   Additional signs and symptoms: Shortness of breath, positive for Covid       FINDINGS:   Heart size is stable.  Increased bilateral airspace opacities.  No   pneumothorax.  No pleural effusion.           Impression   Increasing bilateral airspace opacities suggesting worsening COVID-19   pneumonia                             Assessment:     1. covid pneumonia  2. Possible superadded bact pneumonia          Plan:     1. D/w pt  2. Adequate o2 adm  3. Bd rx as needeed  4. On decadron  5. Cont levaquin  6.  Thanks will follow

## 2021-06-01 LAB
ALBUMIN SERPL-MCNC: 3.7 GM/DL (ref 3.4–5)
ALP BLD-CCNC: 75 IU/L (ref 40–128)
ALT SERPL-CCNC: 117 U/L (ref 10–40)
ANION GAP SERPL CALCULATED.3IONS-SCNC: 13 MMOL/L (ref 4–16)
AST SERPL-CCNC: 65 IU/L (ref 15–37)
BASOPHILS ABSOLUTE: 0 K/CU MM
BASOPHILS RELATIVE PERCENT: 0.1 % (ref 0–1)
BILIRUB SERPL-MCNC: 0.3 MG/DL (ref 0–1)
BUN BLDV-MCNC: 18 MG/DL (ref 6–23)
CALCIUM SERPL-MCNC: 9.2 MG/DL (ref 8.3–10.6)
CHLORIDE BLD-SCNC: 102 MMOL/L (ref 99–110)
CO2: 26 MMOL/L (ref 21–32)
CREAT SERPL-MCNC: 0.7 MG/DL (ref 0.6–1.1)
DIFFERENTIAL TYPE: ABNORMAL
EOSINOPHILS ABSOLUTE: 0 K/CU MM
EOSINOPHILS RELATIVE PERCENT: 0 % (ref 0–3)
GFR AFRICAN AMERICAN: >60 ML/MIN/1.73M2
GFR NON-AFRICAN AMERICAN: >60 ML/MIN/1.73M2
GLUCOSE BLD-MCNC: 196 MG/DL (ref 70–99)
HCT VFR BLD CALC: 41.9 % (ref 37–47)
HEMOGLOBIN: 13.2 GM/DL (ref 12.5–16)
IMMATURE NEUTROPHIL %: 1.5 % (ref 0–0.43)
LYMPHOCYTES ABSOLUTE: 0.9 K/CU MM
LYMPHOCYTES RELATIVE PERCENT: 12.3 % (ref 24–44)
MCH RBC QN AUTO: 27.7 PG (ref 27–31)
MCHC RBC AUTO-ENTMCNC: 31.5 % (ref 32–36)
MCV RBC AUTO: 87.8 FL (ref 78–100)
MONOCYTES ABSOLUTE: 0.4 K/CU MM
MONOCYTES RELATIVE PERCENT: 5 % (ref 0–4)
NUCLEATED RBC %: 0 %
PDW BLD-RTO: 13.6 % (ref 11.7–14.9)
PLATELET # BLD: 278 K/CU MM (ref 140–440)
PMV BLD AUTO: 9.5 FL (ref 7.5–11.1)
POTASSIUM SERPL-SCNC: 4.1 MMOL/L (ref 3.5–5.1)
RBC # BLD: 4.77 M/CU MM (ref 4.2–5.4)
SEGMENTED NEUTROPHILS ABSOLUTE COUNT: 6 K/CU MM
SEGMENTED NEUTROPHILS RELATIVE PERCENT: 81.1 % (ref 36–66)
SODIUM BLD-SCNC: 141 MMOL/L (ref 135–145)
TOTAL IMMATURE NEUTOROPHIL: 0.11 K/CU MM
TOTAL NUCLEATED RBC: 0 K/CU MM
TOTAL PROTEIN: 6.1 GM/DL (ref 6.4–8.2)
WBC # BLD: 7.4 K/CU MM (ref 4–10.5)

## 2021-06-01 PROCEDURE — 80053 COMPREHEN METABOLIC PANEL: CPT

## 2021-06-01 PROCEDURE — 2580000003 HC RX 258: Performed by: INTERNAL MEDICINE

## 2021-06-01 PROCEDURE — 94761 N-INVAS EAR/PLS OXIMETRY MLT: CPT

## 2021-06-01 PROCEDURE — 2700000000 HC OXYGEN THERAPY PER DAY

## 2021-06-01 PROCEDURE — 36415 COLL VENOUS BLD VENIPUNCTURE: CPT

## 2021-06-01 PROCEDURE — 1200000000 HC SEMI PRIVATE

## 2021-06-01 PROCEDURE — 6360000002 HC RX W HCPCS: Performed by: INTERNAL MEDICINE

## 2021-06-01 PROCEDURE — 85025 COMPLETE CBC W/AUTO DIFF WBC: CPT

## 2021-06-01 RX ADMIN — DEXAMETHASONE 6 MG: 4 TABLET ORAL at 09:06

## 2021-06-01 RX ADMIN — SODIUM CHLORIDE, PRESERVATIVE FREE 10 ML: 5 INJECTION INTRAVENOUS at 22:35

## 2021-06-01 RX ADMIN — SODIUM CHLORIDE, PRESERVATIVE FREE 10 ML: 5 INJECTION INTRAVENOUS at 09:06

## 2021-06-01 RX ADMIN — ENOXAPARIN SODIUM 30 MG: 30 INJECTION SUBCUTANEOUS at 09:06

## 2021-06-01 RX ADMIN — ENOXAPARIN SODIUM 30 MG: 30 INJECTION SUBCUTANEOUS at 22:35

## 2021-06-01 RX ADMIN — AZITHROMYCIN MONOHYDRATE 500 MG: 500 INJECTION, POWDER, LYOPHILIZED, FOR SOLUTION INTRAVENOUS at 14:43

## 2021-06-01 ASSESSMENT — PAIN SCALES - GENERAL
PAINLEVEL_OUTOF10: 0

## 2021-06-01 NOTE — PROGRESS NOTES
Hospitalist Progress Note      Name:  Allsion Huerta /Age/Sex: 1985  (43 y.o. female)   MRN & CSN:  [id number] & [id number] Admission Date/Time: 2021  8:09 PM   Location:  -A PCP: Cheryle Felts, MD         Hospital Day: 3    ASSESSMENT & PLAN:  Allison Huerta is a 43 y.o.  female  who presented to the hospital with a complaint of shortness of breath. Patient initially tested for COVID-19 on 2021 and again on 2021. Blood test results came back positive. At the time of the testing, the patient was not symptomatic but her employer requested her to be tested since her son tested positive for Covid and had symptoms. Patient developed symptoms 4 to 5 days later after getting tested positive for COVID-19. Patient admitted to the hospital for acute hypoxic respiratory failure with SPO2 in the 80s with exertion. #.  Acute hypoxic respiratory failure & COVID-19 pneumonia---D-dimer mildly elevated at 468. Procalcitonin low at 0.059. Afebrile. No leukocytosis. Empirically treated with Levaquin on admission. Superimposed bacterial infection less likely. Discussed the discharge with the patient on  but did not felt comfortable going home today.  -Likely discharge tomorrow if she continues to remain in stable  -Continue Decadron and discontinue ABX. Continue O2 support as needed    #. Morbid obesity---312 LBS. BMI 48.96      Diet DIET GENERAL;   DVT Prophylaxis [x] Lovenox, []  Heparin, [] SCDs, [] Ambulation   GI Prophylaxis [] PPI,  [] H2 Blocker,  [] Carafate,  [] Diet/Tube Feeds   Code Status Full Code   Disposition  Home   MDM [] Low, [] Moderate,[x]  High     DC plan likely tomorrow as per pulmonology recommendation. Chief complaint/Interval History/ROS     Patient reports dropping O2 sats especially during sleep. States her shortness of breath is much better now. Denied any chest pain  No acute overnight events. Objective:        Intake/Output Summary (Last 24 hours) at 6/1/2021 0825  Last data filed at 5/31/2021 1845  Gross per 24 hour   Intake 250 ml   Output --   Net 250 ml      Vitals:   Vitals:    06/01/21 0421   BP: (!) 141/101   Pulse: 70   Resp: 17   Temp: 98.1 °F (36.7 °C)   SpO2: 95%     Physical Exam:     Patient says she is doing fine. Reports occasional drop in oxygen especially when sleeping. No acute overnight events.     Medications:   Medications:    azithromycin  500 mg Intravenous Q24H    sodium chloride flush  5-40 mL Intravenous 2 times per day    enoxaparin  30 mg Subcutaneous BID    dexamethasone  6 mg Oral Daily      Infusions:    sodium chloride       PRN Meds: sodium chloride flush, 5-40 mL, PRN  sodium chloride, 25 mL, PRN  promethazine, 12.5 mg, Q6H PRN   Or  ondansetron, 4 mg, Q6H PRN  polyethylene glycol, 17 g, Daily PRN  acetaminophen, 650 mg, Q6H PRN   Or  acetaminophen, 650 mg, Q6H PRN  guaiFENesin-dextromethorphan, 5 mL, Q4H PRN      Electronically signed by Moises Castañeda MD on 6/1/2021 at 8:25 AM

## 2021-06-01 NOTE — PROGRESS NOTES
pulmonary      SUBJECTIVE:  She is stable but pulse ox drops with movement     OBJECTIVE    VITALS:  BP (!) 141/101   Pulse 70   Temp 98.1 °F (36.7 °C) (Oral)   Resp 17   Ht 5' 7\" (1.702 m)   Wt (!) 312 lb 9.6 oz (141.8 kg)   LMP 05/23/2021   SpO2 95%   BMI 48.96 kg/m²   HEAD AND FACE EXAM:  No throat injection, no active exudate,no thrush  NECK EXAM;No JVD, no masses, symmetrical  CHEST EXAM; Expansion equal and symmetrical, no masses  LUNG EXAM; Good breath sounds bilaterally. There are expiratory wheezes both lungs, there are crackles at both lung bases  CARDIOVASCULAR EXAM: Positive S1 and S2, no S3 or S4, no clicks ,no murmurs  RIGHT AND LEFT LOWER EXTRIMITY EXAM: No edema, no swelling, no inflamation  CNS EXAM: Alert and oriented X3          LABS   Lab Results   Component Value Date    WBC 3.3 (L) 05/31/2021    HGB 12.5 05/31/2021    HCT 38.4 05/31/2021    MCV 85.0 05/31/2021     05/31/2021     Lab Results   Component Value Date    CREATININE 0.5 (L) 05/31/2021    BUN 9 05/31/2021     (L) 05/31/2021    K 4.4 05/31/2021    CL 98 (L) 05/31/2021    CO2 23 05/31/2021     Lab Results   Component Value Date    INR 1.10 05/31/2021    PROTIME 13.3 05/31/2021        No results found for: PHOS   No results for input(s): PH, PO2ART, BAW5WFP, HCO3, BEART, O2SAT in the last 72 hours.       Wt Readings from Last 3 Encounters:   05/30/21 (!) 312 lb 9.6 oz (141.8 kg)   05/30/21 (!) 315 lb (142.9 kg)   05/26/21 (!) 315 lb (142.9 kg)               ASSESMENT  covid pneumonia  amada pneumonia        PLAN  1. cpm  2. o2 as needed    6/1/2021  Vero Donahue MD, M.D.

## 2021-06-01 NOTE — CARE COORDINATION
Reviewed chart and attempted to call pt in room but phone line busy. Per chart review pt lives with spouse, has a PCP and insurance. May need to discharge on O2 but no other needs identified at this time. CM available if any needs arise.

## 2021-06-02 VITALS
WEIGHT: 293 LBS | OXYGEN SATURATION: 95 % | HEIGHT: 67 IN | HEART RATE: 78 BPM | SYSTOLIC BLOOD PRESSURE: 159 MMHG | RESPIRATION RATE: 16 BRPM | BODY MASS INDEX: 45.99 KG/M2 | DIASTOLIC BLOOD PRESSURE: 98 MMHG | TEMPERATURE: 98.5 F

## 2021-06-02 PROCEDURE — 6370000000 HC RX 637 (ALT 250 FOR IP): Performed by: FAMILY MEDICINE

## 2021-06-02 PROCEDURE — 6360000002 HC RX W HCPCS: Performed by: INTERNAL MEDICINE

## 2021-06-02 PROCEDURE — 2580000003 HC RX 258: Performed by: INTERNAL MEDICINE

## 2021-06-02 PROCEDURE — 6370000000 HC RX 637 (ALT 250 FOR IP): Performed by: INTERNAL MEDICINE

## 2021-06-02 RX ORDER — ESCITALOPRAM OXALATE 10 MG/1
10 TABLET ORAL DAILY
Status: DISCONTINUED | OUTPATIENT
Start: 2021-06-02 | End: 2021-06-02 | Stop reason: HOSPADM

## 2021-06-02 RX ORDER — GUAIFENESIN/DEXTROMETHORPHAN 100-10MG/5
5 SYRUP ORAL EVERY 4 HOURS PRN
Qty: 120 ML | Refills: 0 | Status: SHIPPED | OUTPATIENT
Start: 2021-06-02 | End: 2021-06-09

## 2021-06-02 RX ORDER — DEXAMETHASONE 6 MG/1
6 TABLET ORAL DAILY
Qty: 2 TABLET | Refills: 0 | Status: SHIPPED | OUTPATIENT
Start: 2021-06-03 | End: 2021-06-05

## 2021-06-02 RX ADMIN — ESCITALOPRAM OXALATE 10 MG: 10 TABLET ORAL at 13:42

## 2021-06-02 RX ADMIN — ENOXAPARIN SODIUM 30 MG: 30 INJECTION SUBCUTANEOUS at 10:45

## 2021-06-02 RX ADMIN — SODIUM CHLORIDE, PRESERVATIVE FREE 10 ML: 5 INJECTION INTRAVENOUS at 10:46

## 2021-06-02 RX ADMIN — AZITHROMYCIN MONOHYDRATE 500 MG: 500 INJECTION, POWDER, LYOPHILIZED, FOR SOLUTION INTRAVENOUS at 13:00

## 2021-06-02 RX ADMIN — DEXAMETHASONE 6 MG: 4 TABLET ORAL at 10:45

## 2021-06-02 RX ADMIN — ACETAMINOPHEN 650 MG: 325 TABLET ORAL at 05:21

## 2021-06-02 RX ADMIN — ACETAMINOPHEN 650 MG: 325 TABLET ORAL at 11:38

## 2021-06-02 ASSESSMENT — PAIN DESCRIPTION - DESCRIPTORS: DESCRIPTORS: HEADACHE

## 2021-06-02 ASSESSMENT — PAIN SCALES - GENERAL
PAINLEVEL_OUTOF10: 3
PAINLEVEL_OUTOF10: 0
PAINLEVEL_OUTOF10: 2
PAINLEVEL_OUTOF10: 0

## 2021-06-02 ASSESSMENT — PAIN DESCRIPTION - ONSET: ONSET: GRADUAL

## 2021-06-02 ASSESSMENT — PAIN - FUNCTIONAL ASSESSMENT: PAIN_FUNCTIONAL_ASSESSMENT: ACTIVITIES ARE NOT PREVENTED

## 2021-06-02 ASSESSMENT — PAIN DESCRIPTION - ORIENTATION: ORIENTATION: MID

## 2021-06-02 ASSESSMENT — PAIN DESCRIPTION - PROGRESSION: CLINICAL_PROGRESSION: NOT CHANGED

## 2021-06-02 ASSESSMENT — PAIN DESCRIPTION - LOCATION: LOCATION: HEAD

## 2021-06-02 ASSESSMENT — PAIN DESCRIPTION - PAIN TYPE: TYPE: OTHER (COMMENT)

## 2021-06-02 ASSESSMENT — PAIN DESCRIPTION - FREQUENCY: FREQUENCY: INTERMITTENT

## 2021-06-02 NOTE — DISCHARGE SUMMARY
Hospitalist Discharge Summary        Patient: Bebe Zamorano  YOB: 1985  MRN: [id number]   Acct: [de-identified]    Primary Care Physician: Elida Almazan MD    Admit date  5/30/2021    Discharge date:  6/2/2021 12:46 PM  Discharge Diagnoses: Active Hospital Problems    Diagnosis Date Noted    Respiratory failure [facility]) [J96.90] 05/30/2021       Code Status:  Full Code     Chief Complaint on presentation :-Increased shortness of breath      Initial admission HPI as below:-  Bebe Zamorano is a 43 y.o. female admitted for shortness of breath this been progressively getting worse on ambulation since Sunday. Patient states that she checks her oxygen level and it was in the low 80s. She was diagnosed with COVID-19 about 10 days ago, she was started on Zithromax and was given Decadron, she states that she got Covid from her son from school. She is a , otherwise she feels well on resting, does not feel short of breath on resting. Patient denies any fevers or chills. States her last fever was couple days ago. Patient denies any other complaints, such as rash, diarrhea, states otherwise overall she is feeling well. Hospital problem list with assessment and plan updates:-  #.  Acute hypoxic respiratory failure & COVID-19 pneumonia---D-dimer mildly elevated at 468. Procalcitonin low at 0.059. Afebrile. No leukocytosis. Empirically treated with Levaquin on admission. Superimposed bacterial infection less likely. Discussed the discharge with the patient on 5/31 but did not felt comfortable going home today.  -Likely discharge tomorrow if she continues to remain in stable  -Continue Decadron and discontinue ABX. Continue O2 support as needed     #. BMI more than 45/morbid obesity---312 LBS. BMI 48.96    #.   Depression: continue Lexapro 10 mg daily    Additional discharge final recommendations as below:-  At the day of discharge patient was tolerating orally with no other complaints, she had a good bowel movement in addition to good urinary output, she gained a good candidate for discharge and to follow-up with her primary care physician in 1 week. Prescribed dexamethasone 6 mg daily for 2 more days upon discharge and instructed to restart her Lexapro 10 mg daily. Instructed to come back to the ER soon as she gets severe shortness of breath and high fever in addition to other warning symptoms and signs discussed with the patient. All appointments obtained for the patient at the day of discharge with other specialities including PCP in one week. All questions and concerns addressed. Patient verbalized understandings and was agreeable with discharge planning. Physical Exam:-  Vitals:   Patient Vitals for the past 24 hrs:   BP Temp Temp src Pulse Resp SpO2 Weight   06/02/21 1052 130/88 97.7 °F (36.5 °C) Oral 88 16 96 % --   06/02/21 0430 126/73 98 °F (36.7 °C) Oral 90 14 94 % (!) 310 lb (140.6 kg)   06/01/21 2133 (!) 140/90 98.2 °F (36.8 °C) Oral 95 16 95 % --   06/01/21 1447 (!) 153/82 98.7 °F (37.1 °C) Oral 84 19 94 % --     Weight:   Weight: (!) 310 lb (140.6 kg)   24 hour intake/output:     Intake/Output Summary (Last 24 hours) at 6/2/2021 1246  Last data filed at 6/2/2021 0930  Gross per 24 hour   Intake 490 ml   Output --   Net 490 ml       1. General appearance: No apparent distress, appears stated age and cooperative. Morbid obesity  2. HEENT: Normal cephalic, atraumatic without obvious deformity. Pupils equal, round, and reactive to light. Extra ocular muscles intact. Conjunctivae/corneas clear. 3. Neck: Supple, with full range of motion. No jugular venous distention. Trachea midline. 4. Respiratory:  Normal respiratory effort. Clear to auscultation, bilaterally without Rales/Wheezes/Rhonchi. 5. Cardiovascular: Regular rate and rhythm with normal S1/S2 without murmurs, rubs or gallops.   6. Abdomen: Soft, non-tender, non-distended with normal bowel sounds. 7. Musculoskeletal:  No clubbing, cyanosis or edema bilaterally. 8. Skin: Skin color, texture, turgor normal.  No rashes or lesions. 9. Neurologic:  Neurovascularly intact without any focal sensory/motor deficits. Cranial nerves: II-XII intact, grossly non-focal.  10. Psychiatric: Alert and oriented, thought content appropriate, normal insight  11. Capillary Refill: Brisk,< 3 seconds   12.  Peripheral Pulses: +2 palpable, equal bilaterally       Discharge Medications:-      Medication List      START taking these medications    dexamethasone 6 MG tablet  Commonly known as: DECADRON  Take 1 tablet by mouth daily for 2 days  Start taking on: Sera 3, 2021     guaiFENesin-dextromethorphan 100-10 MG/5ML syrup  Commonly known as: ROBITUSSIN DM  Take 5 mLs by mouth every 4 hours as needed for Cough        CONTINUE taking these medications    albuterol sulfate  (90 Base) MCG/ACT inhaler  Commonly known as: Ventolin HFA  Inhale 2 puffs into the lungs 4 times daily as needed for Wheezing or Shortness of Breath     Lexapro 10 MG tablet  Generic drug: escitalopram        STOP taking these medications    azithromycin 250 MG tablet  Commonly known as: Zithromax           Where to Get Your Medications      These medications were sent to [address] [place] [address] [address] [facility] [phone]  [address] [facility] [address]    Phone: [phone]   · dexamethasone 6 MG tablet  · guaiFENesin-dextromethorphan 100-10 MG/5ML syrup          Labs :-  Recent Results (from the past 72 hour(s))   CBC Auto Differential    Collection Time: 05/30/21  2:45 PM   Result Value Ref Range    WBC 4.7 4.0 - 10.5 K/CU MM    RBC 6.17 (H) 4.2 - 5.4 M/CU MM    Hemoglobin 17.3 (H) 12.5 - 16.0 GM/DL    Hematocrit 52.9 (H) 37 - 47 %    MCV 85.7 78 - 100 FL    MCH 28.0 27 - 31 PG    MCHC 32.7 32.0 - 36.0 %    RDW 14.2 11.7 - 14.9 %    Platelets 464 852 - 816 K/CU MM    MPV 9.5 7.5 - 11.1 FL Differential Type AUTOMATED DIFFERENTIAL     Segs Relative 73.2 (H) 36 - 66 %    Lymphocytes % 17.9 (L) 24 - 44 %    Monocytes % 8.3 (H) 0 - 4 %    Eosinophils % 0.2 0 - 3 %    Basophils % 0.0 0 - 1 %    Segs Absolute 3.4 K/CU MM    Lymphocytes Absolute 0.8 K/CU MM    Monocytes Absolute 0.4 K/CU MM    Eosinophils Absolute 0.0 K/CU MM    Basophils Absolute 0.0 K/CU MM    Immature Neutrophil % 0.4 0 - 0.43 %    Total Immature Neutrophil 0.02 K/CU MM   Comprehensive Metabolic Panel    Collection Time: 05/30/21  2:45 PM   Result Value Ref Range    Sodium 138 135 - 145 MMOL/L    Potassium 4.3 3.5 - 5.1 MMOL/L    Chloride 102 99 - 110 mMol/L    CO2 29 21 - 32 MMOL/L    BUN 9 6 - 23 MG/DL    CREATININE 0.7 0.6 - 1.1 MG/DL    Glucose 95 70 - 99 MG/DL    Calcium 8.9 8.3 - 10.6 MG/DL    Albumin 3.8 3.4 - 5.0 GM/DL    Total Protein 6.7 6.4 - 8.2 GM/DL    Total Bilirubin 0.3 0.0 - 1.0 MG/DL    ALT 41 (H) 10 - 40 U/L    AST 40 (H) 15 - 37 IU/L    Alkaline Phosphatase 70 40 - 129 IU/L    GFR Non-African American >60 >60 mL/min/1.73m2    GFR African American >60 >60 mL/min/1.73m2    Anion Gap 7 4 - 16   POCT Venous    Collection Time: 05/30/21  3:01 PM   Result Value Ref Range    pH, Joel 7.44 (H) 7.32 - 7.42    pCO2, Joel 36.4 (L) 38 - 52 mmHG    pO2, Joel 60.4 (H) 28 - 48 mmHG    Base Exc, Mixed 1.0 0 - 2.3    Base Excess HIDE 0 - 2.4    HCO3, Venous 24.9 19 - 25 MMOL/L    O2 Sat, Joel 91.9 (H) 50 - 70 %    CO2 Content 26.0 (H) 19 - 24 MMOL/L    Source: Venous    CBC Auto Differential    Collection Time: 05/31/21  1:00 AM   Result Value Ref Range    WBC 3.1 (L) 4.0 - 10.5 K/CU MM    RBC 4.41 4.2 - 5.4 M/CU MM    Hemoglobin 12.6 12.5 - 16.0 GM/DL    Hematocrit 37.4 37 - 47 %    MCV 84.8 78 - 100 FL    MCH 28.6 27 - 31 PG    MCHC 33.7 32.0 - 36.0 %    RDW 13.7 11.7 - 14.9 %    Platelets 108 250 - 619 K/CU MM    MPV 9.7 7.5 - 11.1 FL    Differential Type AUTOMATED DIFFERENTIAL     Segs Relative 81.4 (H) 36 - 66 %    Lymphocytes % 13.5 (L) 24 - 44 %    Monocytes % 4.5 (H) 0 - 4 %    Eosinophils % 0.0 0 - 3 %    Basophils % 0.0 0 - 1 %    Segs Absolute 2.5 K/CU MM    Lymphocytes Absolute 0.4 K/CU MM    Monocytes Absolute 0.1 K/CU MM    Eosinophils Absolute 0.0 K/CU MM    Basophils Absolute 0.0 K/CU MM    Nucleated RBC % 0.0 %    Total Nucleated RBC 0.0 K/CU MM    Total Immature Neutrophil 0.02 K/CU MM    Immature Neutrophil % 0.6 (H) 0 - 0.43 %   Comprehensive Metabolic Panel w/ Reflex to MG    Collection Time: 05/31/21  1:00 AM   Result Value Ref Range    Sodium 132 (L) 135 - 145 MMOL/L    Potassium 4.5 3.5 - 5.1 MMOL/L    Chloride 97 (L) 99 - 110 mMol/L    CO2 23 21 - 32 MMOL/L    BUN 8 6 - 23 MG/DL    CREATININE 0.5 (L) 0.6 - 1.1 MG/DL    Glucose 194 (H) 70 - 99 MG/DL    Calcium 8.6 8.3 - 10.6 MG/DL    Albumin 3.9 3.4 - 5.0 GM/DL    Total Protein 6.0 (L) 6.4 - 8.2 GM/DL    Total Bilirubin 0.3 0.0 - 1.0 MG/DL    ALT 45 (H) 10 - 40 U/L    AST 37 15 - 37 IU/L    Alkaline Phosphatase 65 40 - 129 IU/L    GFR Non-African American >60 >60 mL/min/1.73m2    GFR African American >60 >60 mL/min/1.73m2    Anion Gap 12 4 - 16   Procalcitonin    Collection Time: 05/31/21  1:00 AM   Result Value Ref Range    Procalcitonin 0.059    Legionella antigen, urine    Collection Time: 05/31/21  1:00 AM    Specimen: Urine   Result Value Ref Range    Legionella Urinary Ag NEGATIVE NEGATIVE   Strep Pneumoniae Antigen    Collection Time: 05/31/21  1:00 AM    Specimen: CSF   Result Value Ref Range    Strep pneumo Ag URINE NEGATIVE    CBC Auto Differential    Collection Time: 05/31/21  5:30 AM   Result Value Ref Range    WBC 3.3 (L) 4.0 - 10.5 K/CU MM    RBC 4.52 4.2 - 5.4 M/CU MM    Hemoglobin 12.5 12.5 - 16.0 GM/DL    Hematocrit 38.4 37 - 47 %    MCV 85.0 78 - 100 FL    MCH 27.7 27 - 31 PG    MCHC 32.6 32.0 - 36.0 %    RDW 13.8 11.7 - 14.9 %    Platelets 082 161 - 786 K/CU MM    MPV 9.6 7.5 - 11.1 FL    Differential Type AUTOMATED DIFFERENTIAL     Segs Relative 83.2 (H) 36 - 66 %    Lymphocytes % 11.7 (L) 24 - 44 %    Monocytes % 4.5 (H) 0 - 4 %    Eosinophils % 0.0 0 - 3 %    Basophils % 0.0 0 - 1 %    Segs Absolute 2.8 K/CU MM    Lymphocytes Absolute 0.4 K/CU MM    Monocytes Absolute 0.2 K/CU MM    Eosinophils Absolute 0.0 K/CU MM    Basophils Absolute 0.0 K/CU MM    Nucleated RBC % 0.0 %    Total Nucleated RBC 0.0 K/CU MM    Total Immature Neutrophil 0.02 K/CU MM    Immature Neutrophil % 0.6 (H) 0 - 0.43 %   Comprehensive Metabolic Panel w/ Reflex to MG    Collection Time: 05/31/21  5:30 AM   Result Value Ref Range    Sodium 132 (L) 135 - 145 MMOL/L    Potassium 4.4 3.5 - 5.1 MMOL/L    Chloride 98 (L) 99 - 110 mMol/L    CO2 23 21 - 32 MMOL/L    BUN 9 6 - 23 MG/DL    CREATININE 0.5 (L) 0.6 - 1.1 MG/DL    Glucose 183 (H) 70 - 99 MG/DL    Calcium 8.6 8.3 - 10.6 MG/DL    Albumin 3.9 3.4 - 5.0 GM/DL    Total Protein 6.2 (L) 6.4 - 8.2 GM/DL    Total Bilirubin 0.3 0.0 - 1.0 MG/DL    ALT 46 (H) 10 - 40 U/L    AST 34 15 - 37 IU/L    Alkaline Phosphatase 67 40 - 129 IU/L    GFR Non-African American >60 >60 mL/min/1.73m2    GFR African American >60 >60 mL/min/1.73m2    Anion Gap 11 4 - 16   Protime-INR    Collection Time: 05/31/21  5:30 AM   Result Value Ref Range    Protime 13.3 11.7 - 14.5 SECONDS    INR 1.10 INDEX   APTT    Collection Time: 05/31/21  5:30 AM   Result Value Ref Range    aPTT 34.2 25.1 - 37.1 SECONDS   Fibrinogen    Collection Time: 05/31/21  5:30 AM   Result Value Ref Range    Fibrinogen 629 (H) 196.9 - 442.1 MG/DL   C-Reactive Protein    Collection Time: 05/31/21  5:30 AM   Result Value Ref Range    CRP, High Sensitivity 133.8 mg/L   Lactate Dehydrogenase    Collection Time: 05/31/21  5:30 AM   Result Value Ref Range     (H) 120 - 246 IU/L   Ferritin    Collection Time: 05/31/21  5:30 AM   Result Value Ref Range    Ferritin 464 (H) 15 - 150 NG/ML   D-Dimer, Quantitative    Collection Time: 05/31/21  5:30 AM   Result Value Ref Range    D-Dimer, Quant 468 (H) <230 NG/mL(DDU)   Lactic Acid, Plasma    Collection Time: 05/31/21  5:30 AM   Result Value Ref Range    Lactate 0.8 0.4 - 2.0 mMOL/L   Vitamin D 25 Hydroxy    Collection Time: 05/31/21  5:30 AM   Result Value Ref Range    Vit D, 25-Hydroxy 37.56 >20 NG/ML   Troponin    Collection Time: 05/31/21  5:30 AM   Result Value Ref Range    Troponin T <0.010 <0.01 NG/ML   CBC Auto Differential    Collection Time: 06/01/21 11:20 AM   Result Value Ref Range    WBC 7.4 4.0 - 10.5 K/CU MM    RBC 4.77 4.2 - 5.4 M/CU MM    Hemoglobin 13.2 12.5 - 16.0 GM/DL    Hematocrit 41.9 37 - 47 %    MCV 87.8 78 - 100 FL    MCH 27.7 27 - 31 PG    MCHC 31.5 (L) 32.0 - 36.0 %    RDW 13.6 11.7 - 14.9 %    Platelets 418 811 - 856 K/CU MM    MPV 9.5 7.5 - 11.1 FL    Differential Type AUTOMATED DIFFERENTIAL     Segs Relative 81.1 (H) 36 - 66 %    Lymphocytes % 12.3 (L) 24 - 44 %    Monocytes % 5.0 (H) 0 - 4 %    Eosinophils % 0.0 0 - 3 %    Basophils % 0.1 0 - 1 %    Segs Absolute 6.0 K/CU MM    Lymphocytes Absolute 0.9 K/CU MM    Monocytes Absolute 0.4 K/CU MM    Eosinophils Absolute 0.0 K/CU MM    Basophils Absolute 0.0 K/CU MM    Nucleated RBC % 0.0 %    Total Nucleated RBC 0.0 K/CU MM    Total Immature Neutrophil 0.11 K/CU MM    Immature Neutrophil % 1.5 (H) 0 - 0.43 %   Comprehensive Metabolic Panel w/ Reflex to MG    Collection Time: 06/01/21 11:20 AM   Result Value Ref Range    Sodium 141 135 - 145 MMOL/L    Potassium 4.1 3.5 - 5.1 MMOL/L    Chloride 102 99 - 110 mMol/L    CO2 26 21 - 32 MMOL/L    BUN 18 6 - 23 MG/DL    CREATININE 0.7 0.6 - 1.1 MG/DL    Glucose 196 (H) 70 - 99 MG/DL    Calcium 9.2 8.3 - 10.6 MG/DL    Albumin 3.7 3.4 - 5.0 GM/DL    Total Protein 6.1 (L) 6.4 - 8.2 GM/DL    Total Bilirubin 0.3 0.0 - 1.0 MG/DL     (H) 10 - 40 U/L    AST 65 (H) 15 - 37 IU/L    Alkaline Phosphatase 75 40 - 128 IU/L    GFR Non-African American >60 >60 mL/min/1.73m2    GFR African American >60 >60 mL/min/1.73m2    Anion Gap 13 4 - 16 Printed on:06/02/21 9908   Medication Information                      albuterol sulfate HFA (VENTOLIN HFA) 108 (90 Base) MCG/ACT inhaler  Inhale 2 puffs into the lungs 4 times daily as needed for Wheezing or Shortness of Breath             dexamethasone (DECADRON) 6 MG tablet  Take 1 tablet by mouth daily for 2 days             escitalopram (LEXAPRO) 10 MG tablet  Take 10 mg by mouth daily             guaiFENesin-dextromethorphan (ROBITUSSIN DM) 100-10 MG/5ML syrup  Take 5 mLs by mouth every 4 hours as needed for Cough                     Time Spent:- 45 minutes    Electronically signed by Helene Cochran MD on 6/2/2021 at 12:46 PM  Discharging Hospitalist

## 2021-06-02 NOTE — PLAN OF CARE
Problem: Airway Clearance - Ineffective  Goal: Achieve or maintain patent airway  Outcome: Ongoing     Problem: Gas Exchange - Impaired  Goal: Absence of hypoxia  Outcome: Ongoing  Goal: Promote optimal lung function  Outcome: Ongoing     Problem: Breathing Pattern - Ineffective  Goal: Ability to achieve and maintain a regular respiratory rate  Outcome: Ongoing     Problem: Body Temperature -  Risk of, Imbalanced  Goal: Ability to maintain a body temperature within defined limits  Outcome: Ongoing  Goal: Will regain or maintain usual level of consciousness  Outcome: Ongoing  Goal: Complications related to the disease process, condition or treatment will be avoided or minimized  Outcome: Ongoing     Problem: Isolation Precautions - Risk of Spread of Infection  Goal: Prevent transmission of infection  Outcome: Ongoing     Problem: Nutrition Deficits  Goal: Optimize nutritional status  Outcome: Ongoing     Problem: Risk for Fluid Volume Deficit  Goal: Maintain normal heart rhythm  Outcome: Ongoing  Goal: Maintain absence of muscle cramping  Outcome: Ongoing  Goal: Maintain normal serum potassium, sodium, calcium, phosphorus, and pH  Outcome: Ongoing     Problem: Loneliness or Risk for Loneliness  Goal: Demonstrate positive use of time alone when socialization is not possible  Outcome: Ongoing     Problem: Fatigue  Goal: Verbalize increase energy and improved vitality  Outcome: Ongoing     Problem: Patient Education: Go to Patient Education Activity  Goal: Patient/Family Education  Outcome: Ongoing     Problem: Pain:  Description: Pain management should include both nonpharmacologic and pharmacologic interventions.   Goal: Pain level will decrease  Description: Pain level will decrease  Outcome: Ongoing  Goal: Control of acute pain  Description: Control of acute pain  Outcome: Ongoing  Goal: Control of chronic pain  Description: Control of chronic pain  Outcome: Ongoing

## 2021-06-02 NOTE — PROGRESS NOTES
Went over discharge instructions with Pt and she voiced understanding of them. All of her questions were answered. Pt is still A & O x 4, her respirations are still easy & unlabored on room air, and her skin is pink, warm, and dry. She states she is still pain free and currently denies any shortness of breath. Pt was to go home with her  and she was escorted out to the main entrance per wheelchair per Stepdown ICU Tech's assist. Pt had all of her belongings with her when she was discharged- she had her cell phone, cell phone , and was wearing her clothes and shoes she came in here with.

## 2021-06-02 NOTE — PROGRESS NOTES
pulmonary      SUBJECTIVE: doing well     OBJECTIVE    VITALS:  /73   Pulse 90   Temp 98 °F (36.7 °C) (Oral)   Resp 14   Ht 5' 7\" (1.702 m)   Wt (!) 310 lb (140.6 kg)   LMP 05/23/2021   SpO2 94%   BMI 48.55 kg/m²   HEAD AND FACE EXAM:  No throat injection, no active exudate,no thrush  NECK EXAM;No JVD, no masses, symmetrical  CHEST EXAM; Expansion equal and symmetrical, no masses  LUNG EXAM; Good breath sounds bilaterally. There are expiratory wheezes both lungs, there are crackles at both lung bases  CARDIOVASCULAR EXAM: Positive S1 and S2, no S3 or S4, no clicks ,no murmurs  RIGHT AND LEFT LOWER EXTRIMITY EXAM: No edema, no swelling, no inflamation  CNS EXAM: Alert and oriented X3          LABS   Lab Results   Component Value Date    WBC 7.4 06/01/2021    HGB 13.2 06/01/2021    HCT 41.9 06/01/2021    MCV 87.8 06/01/2021     06/01/2021     Lab Results   Component Value Date    CREATININE 0.7 06/01/2021    BUN 18 06/01/2021     06/01/2021    K 4.1 06/01/2021     06/01/2021    CO2 26 06/01/2021     Lab Results   Component Value Date    INR 1.10 05/31/2021    PROTIME 13.3 05/31/2021        No results found for: PHOS   No results for input(s): PH, PO2ART, CNK6UNW, HCO3, BEART, O2SAT in the last 72 hours. Wt Readings from Last 3 Encounters:   06/02/21 (!) 310 lb (140.6 kg)   05/30/21 (!) 315 lb (142.9 kg)   05/26/21 (!) 315 lb (142.9 kg)               ASSESMENT  covid pneumonia        PLAN  1. She can be dc  2.  I will sign off    6/2/2021  Casi Cortez MD, M.D.

## 2021-06-02 NOTE — PROGRESS NOTES
Nutrition Assessment     Type and Reason for Visit: Initial, Positive Nutrition Screen    Nutrition Assessment:  Pt assessed due to a positives screen for weight loss and poor intake. Per the chart, the pt has not lost any weight and she is consuming 100% of her meals. She is at low risk at this time.     Electronically signed by Tia Hussein RD, LD on 9/0/39 at 8:09 PM EDT    Contact: [phone]

## 2021-06-03 ENCOUNTER — CARE COORDINATION (OUTPATIENT)
Dept: CASE MANAGEMENT | Age: 36
End: 2021-06-03

## 2021-06-03 NOTE — CARE COORDINATION
[id number] [facility] discharge call. Introduced self and explained program, agreeable to ongoing follow up. Phone Assessment: Patient reports feeling much better. States \"my lungs feel pretty good\". Denies sob, cough, fever, chills, ac distress or other Covid19 related sx. Current O2 sat 97% RA. Reports she is getting up periodically during the day, pacing activity and chores. Education Provided:   Reviewed medical action plan and Covid19 red flags such as increased shortness of breath, increasing fever and signs of decompensation. Discussed increased risk of blood clots associated w/ Covid19, sx and preventative measures. Patient verbalizes understanding. Discussed exposure protocols. Advised to contact HD/PCP re: any additional Covid19 questions. Patient to further discuss receiving Covid19 vaccinrs w/ PCP, undecided at this time. AVS/Meds: Confirmed copy of AVS received, reviewed with Patient. Confirmed Patient obtained and is taking Decadron, Robitussin DM as directed. Med education provided, questions answered, verbalizes understanding. Stressed importance of med compliance. Medication review completed with Patient . Resource Need Assessment:   Living Arrangements: Lives w/ Spouse, children  ADLS: Independent w/ adls, drives, employed as /  DME: Denies need  HHC: Denies need    Hospital Follow Up Appointments: Discussed below appts, stressed importance of 7 day hospital follow up for continuity of care. Transportation per self or . Dr Molly Mcintosh 6/4/21 2:30pm    Advance Care Planning   Healthcare Decision Maker:    Primary Decision Maker: Ann Marie Javed - Spouse - [phone]  Full code. No advance directives. Not interested in ACP referral at this time. Advised to contact PCP 24/7 re: any health concerns for early outpatient intervention in an effort to avoid hospitalization. Advised 911 if noting acute distress.     Jenaro Christensen RN

## 2021-06-16 ENCOUNTER — CARE COORDINATION (OUTPATIENT)
Dept: CASE MANAGEMENT | Age: 36
End: 2021-06-16

## 2021-06-16 NOTE — CARE COORDINATION
[facility] Trans[facility] Follow Up Call    2021    Patient: Celestino Negrete  Patient : 1985   MRN: [id number]  Reason for Admission:  Randy Lino Resp Failure  Discharge Date: 21 RARS: Readmission Risk Score: 9    COVID19 + MONITORING  6/3/21-7/3/21    1st attempt to reach for follow up call unsuccessful. HIPAA compliant messages x 2  left requesting call back.      Sylvia Mirza RN

## 2021-06-28 ENCOUNTER — CARE COORDINATION (OUTPATIENT)
Dept: CASE MANAGEMENT | Age: 36
End: 2021-06-28

## 2021-06-28 NOTE — CARE COORDINATION
[facility] Transitions Follow Up Call    2021    Patient: Magnolia Mccallum  Patient : 1985   MRN: [id number]  Reason for Admission: Covid19 + Pna, Ac Resp Failure  Discharge Date: 21 RARS: Readmission Risk Score: 9    [facility] 2nd attempted outreach. Left message and contact information for call back. No further outreach will be attempted.      Kory Zambrano LPN  Care Coordinator

## 2021-06-28 NOTE — CARE COORDINATION
[facility] Transitions Follow Up Call    2021    Patient: Padmini Roche  Patient : 1985   MRN: [id number]  Reason for Admission: Randy Garrett Resp Failure  Discharge Date: 21 RARS: Readmission Risk Score: 9    Spoke with: Padmini Roche who returned my call. She states that she is doing fine. Patient denies any sob, pain, headache, n/v/d/ or fever/chills. Patient report that she do continue with a little fatigue but getting better daily. Patient reports that she has returned to work. Appetite, fluid intake, bowel and bladder good. She reports that she has a work physical today and will get the chest xray completed then. Very appreciative of the outreach and care. No further outreach is needed. Care Transitions Subsequent and Final Call    Subsequent and Final Calls  Do you have any ongoing symptoms?: No  Have your medications changed?: No  Do you have any questions related to your medications?: No  Do you currently have any active services?: No  Do you have any needs or concerns that I can assist you with?: No  Identified Barriers: None  Care Transitions Interventions  No Identified Needs   Home Care Waiver: Declined        DME Assistance: Declined   Other Interventions: Follow Up  No future appointments.     Omid Ospina LPN

## 2021-12-02 ENCOUNTER — APPOINTMENT (OUTPATIENT)
Dept: GENERAL RADIOLOGY | Age: 36
End: 2021-12-02
Payer: COMMERCIAL

## 2021-12-02 ENCOUNTER — HOSPITAL ENCOUNTER (EMERGENCY)
Age: 36
Discharge: HOME | End: 2021-12-02
Attending: EMERGENCY MEDICINE
Payer: COMMERCIAL

## 2021-12-02 VITALS
BODY MASS INDEX: 45.99 KG/M2 | WEIGHT: 293 LBS | OXYGEN SATURATION: 96 % | SYSTOLIC BLOOD PRESSURE: 150 MMHG | TEMPERATURE: 97.4 F | RESPIRATION RATE: 16 BRPM | HEIGHT: 67 IN | HEART RATE: 80 BPM | DIASTOLIC BLOOD PRESSURE: 78 MMHG

## 2021-12-02 DIAGNOSIS — M79.672 ACUTE FOOT PAIN, LEFT: Primary | ICD-10-CM

## 2021-12-02 DIAGNOSIS — R23.8 SKIN BREAKDOWN: ICD-10-CM

## 2021-12-02 DIAGNOSIS — M79.89 FOOT SWELLING: ICD-10-CM

## 2021-12-02 PROCEDURE — 99283 EMERGENCY DEPT VISIT LOW MDM: CPT

## 2021-12-02 PROCEDURE — 73610 X-RAY EXAM OF ANKLE: CPT

## 2021-12-02 PROCEDURE — 6370000000 HC RX 637 (ALT 250 FOR IP): Performed by: EMERGENCY MEDICINE

## 2021-12-02 PROCEDURE — 73630 X-RAY EXAM OF FOOT: CPT

## 2021-12-02 RX ORDER — DOXYCYCLINE HYCLATE 100 MG
100 TABLET ORAL 2 TIMES DAILY
Qty: 20 TABLET | Refills: 0 | Status: SHIPPED | OUTPATIENT
Start: 2021-12-02 | End: 2021-12-12

## 2021-12-02 RX ORDER — DOXYCYCLINE HYCLATE 100 MG
100 TABLET ORAL ONCE
Status: COMPLETED | OUTPATIENT
Start: 2021-12-02 | End: 2021-12-02

## 2021-12-02 RX ADMIN — DOXYCYCLINE HYCLATE 100 MG: 100 TABLET, COATED ORAL at 22:11

## 2021-12-02 ASSESSMENT — PAIN DESCRIPTION - PAIN TYPE: TYPE: ACUTE PAIN

## 2021-12-02 ASSESSMENT — ENCOUNTER SYMPTOMS
EYES NEGATIVE: 1
RESPIRATORY NEGATIVE: 1
BACK PAIN: 0
GASTROINTESTINAL NEGATIVE: 1

## 2021-12-02 ASSESSMENT — PAIN DESCRIPTION - LOCATION: LOCATION: FOOT

## 2021-12-02 ASSESSMENT — PAIN SCALES - GENERAL: PAINLEVEL_OUTOF10: 5

## 2021-12-02 ASSESSMENT — PAIN DESCRIPTION - ORIENTATION: ORIENTATION: LEFT

## 2021-12-03 NOTE — ED PROVIDER NOTES
The history is provided by the patient. Foot Problem  Lower extremity pain location: left foot swellling and ankle swelling no know injury did where high heal boots to work. Lower extremity injury: Overuse from walking up and down stairs. There is also a red rash between some of her toes. Pain details:     Severity:  Mild    Timing:  Constant    Progression:  Unchanged  Chronicity:  New  Relieved by:  Rest  Worsened by: Activity  Ineffective treatments:  None tried  Associated symptoms: swelling    Associated symptoms: no back pain, no decreased ROM, no fatigue, no fever, no itching, no muscle weakness, no neck pain, no numbness, no stiffness and no tingling        Review of Systems   Constitutional: Negative. Negative for fatigue and fever. HENT: Negative. Eyes: Negative. Respiratory: Negative. Cardiovascular: Negative. Gastrointestinal: Negative. Genitourinary: Negative. Musculoskeletal: Negative. Negative for back pain, neck pain and stiffness. Skin: Negative. Negative for itching. Neurological: Negative. All other systems reviewed and are negative. No family history on file.   Social History     Socioeconomic History    Marital status:      Spouse name: Not on file    Number of children: Not on file    Years of education: Not on file    Highest education level: Not on file   Occupational History    Not on file   Tobacco Use    Smoking status: Never Smoker    Smokeless tobacco: Never Used   Vaping Use    Vaping Use: Former   Substance and Sexual Activity    Alcohol use: Yes     Comment: Rarely    Drug use: No    Sexual activity: Not on file   Other Topics Concern    Not on file   Social History Narrative    Not on file     Social Determinants of Health     Financial Resource Strain:     Difficulty of Paying Living Expenses: Not on file   Food Insecurity:     Worried About [address] [employer] in the Last Year: Not on file    [address] in the Last Year: Not on file   Transportation Needs:     Lack of Transportation (Medical): Not on file    Lack of Transportation (Non-Medical): Not on file   Physical Activity:     Days of Exercise per Week: Not on file    Minutes of Exercise per Session: Not on file   Stress:     Feeling of Stress : Not on file   Social Connections:     Frequency of Communication with Friends and Family: Not on file    Frequency of Social Gatherings with Friends and Family: Not on file    Attends Druze Services: Not on file    Active Member of [address] [place] [address] [employer] or Organizations: Not on file    Attends Club or Organization Meetings: Not on file    Marital Status: Not on file   Intimate Partner Violence:     Fear of Current or Ex-Partner: Not on file    Emotionally Abused: Not on file    Physically Abused: Not on file    Sexually Abused: Not on file   Housing Stability:     Unable to Pay for Housing in the Last Year: Not on file    Number of [place] in the Last Year: Not on file    Unstable Housing in the Last Year: Not on file     Past Surgical History:   Procedure Laterality Date    ARM SURGERY Right     TONSILLECTOMY       Past Medical History:   Diagnosis Date    Bacterial meningitis     COVID-19     Depression      Allergies   Allergen Reactions    Penicillins      Prior to Admission medications    Medication Sig Start Date End Date Taking?  Authorizing Provider   doxycycline hyclate (VIBRA-TABS) 100 MG tablet Take 1 tablet by mouth 2 times daily for 10 days 12/2/21 12/12/21 Yes Amye Patches Ray   escitalopram (LEXAPRO) 10 MG tablet Take 10 mg by mouth daily    Historical Provider, MD   albuterol sulfate HFA (VENTOLIN HFA) 108 (90 Base) MCG/ACT inhaler Inhale 2 puffs into the lungs 4 times daily as needed for Wheezing or Shortness of Breath 5/26/21   Amye Patches Ray DO       BP (!) 179/84   Pulse 88   Temp 97.4 °F (36.3 °C) (Tympanic)   Resp 16   Ht 5' 7\" (1.702 m)   Wt (!) 320 lb (145.2 kg)   LMP 11/28/2021   SpO2 97%   BMI 50.12 kg/m²     Physical Exam  Vitals and nursing note reviewed. Constitutional:       Appearance: She is well-developed. HENT:      Head: Normocephalic and atraumatic. Right Ear: External ear normal.      Left Ear: External ear normal.      Nose: Nose normal.   Eyes:      Conjunctiva/sclera: Conjunctivae normal.      Pupils: Pupils are equal, round, and reactive to light. Cardiovascular:      Rate and Rhythm: Normal rate and regular rhythm. Heart sounds: Normal heart sounds. Pulmonary:      Effort: Pulmonary effort is normal.      Breath sounds: Normal breath sounds. Abdominal:      General: Bowel sounds are normal.      Palpations: Abdomen is soft. Musculoskeletal:      Cervical back: Normal range of motion and neck supple. Comments: Left foot dorsum between toes is erythematous and tender to touch. There is soft tissue swelling across the dorsum. Palpable tenderness over bones of mid foot and lateral malleolus with no skin breakdown. Skin:     General: Skin is warm and dry. Neurological:      Mental Status: She is alert and oriented to person, place, and time. GCS: GCS eye subscore is 4. GCS verbal subscore is 5. GCS motor subscore is 6. Psychiatric:         Behavior: Behavior normal.         Thought Content: Thought content normal.         Judgment: Judgment normal.         MDM:    Labs Reviewed - No data to display    XR ANKLE LEFT (MIN 3 VIEWS)   Final Result   No acute abnormality in the ankle or foot. XR FOOT LEFT (MIN 3 VIEWS)   Final Result   No acute abnormality in the ankle or foot. Skin breakdown may be related to friction injury vs athletes use. There are no fractures. Doxycycline and OTC supportive care. ITTLTETS   I have discussed with the patient  my clinical impression and the result of the patient's current clinical evaluation for their presentation.  In addition we discussed the risk and benefits of further testing and hospitalization. I discussed candidly with the patient  and the patient  was allowed to provide input as to their thoughts concerning the current presentation. Although the risk of progression or development of new more serious signs and symptoms cannot be excluded she can try OTC meds and not wearing high heel boots and walk on stairs but if worse return to ER   My typical dicussion, presentation,and considerations for this patients' chief complaint, diagnosis, and differential diagnosis have been considered. I have stressed need for follow up and reexamination for this encounter. I have discussed my clinical impression and the results of the current evaluation. Patient was  prescribed . The medication(s) use,  medication(s) safety and medication(s) interactions with already prescribed medication(s) have been explained and outlined for this encounter. The patient  was educated that it is their responsibility to verify this information is correct at the time of discharge and to contact this department of any complications with the pharmacy providing this medication(s) or if their any difficulty in obtaining this medication(s). Final Impression    1. Acute foot pain, left    2. Skin breakdown    3.  Foot swelling              287 Jaymie Laughlin DO  12/02/21 [id number]

## 2021-12-03 NOTE — ED NOTES
Discharge instructions and prescription reviewed with pt and verbalizes understanding.      Ray Richardson RN  12/02/21 [id number]

## 2023-04-18 ENCOUNTER — TELEPHONE (OUTPATIENT)
Dept: BARIATRICS/WEIGHT MGMT | Age: 38
End: 2023-04-18

## 2023-04-27 ENCOUNTER — OFFICE VISIT (OUTPATIENT)
Dept: BARIATRICS/WEIGHT MGMT | Age: 38
End: 2023-04-27

## 2023-04-27 VITALS — BODY MASS INDEX: 45.99 KG/M2 | WEIGHT: 293 LBS | HEIGHT: 67 IN

## 2023-04-27 DIAGNOSIS — E66.01 MORBID OBESITY WITH BMI OF 50.0-59.9, ADULT (HCC): Primary | ICD-10-CM

## 2023-04-27 PROCEDURE — 99999 PR OFFICE/OUTPT VISIT,PROCEDURE ONLY: CPT

## 2023-04-27 NOTE — PROGRESS NOTES
Outpatient Nutrition Counseling - Non-Surgical Weight Loss Program    REASON FOR VISIT: Initial Non-Surgical Program    Chief Complaint:    Chief Complaint   Patient presents with    Weight Management       SUBJECTIVE:  Pt here for initial nutrition consult in non-surgical program. Her challenges include a sedentary lifestyle. She has successfully lost 100 lb before on [employer] Program.  The patient is a 34 y.o. female being seen for obesity, enrolled in Non-Surgical Weight Loss Program; [facility], Height: 5' 7\" (170.2 cm), Weight: (!) 349 lb 9.6 oz (158.6 kg), Current Body mass index is 54.76 kg/m². patient's PCP is Jarocho López MD     Comorbid Conditions:  Significant diseases affecting this patient are   Past Medical History:   Diagnosis Date    Bacterial meningitis     COVID-19     Depression    . Review of Systems - Review of Systems  Otherwise per HPI. Allergies: Allergies   Allergen Reactions    Penicillins        Past Surgical History:  Past Surgical History:   Procedure Laterality Date    ARM SURGERY Right     TONSILLECTOMY         Family History:  No family history on file.     Social History:  Social History     Socioeconomic History    Marital status:      Spouse name: Not on file    Number of children: Not on file    Years of education: Not on file    Highest education level: Not on file   Occupational History    Not on file   Tobacco Use    Smoking status: Never    Smokeless tobacco: Never   Vaping Use    Vaping Use: Former   Substance and Sexual Activity    Alcohol use: Yes     Comment: Rarely    Drug use: No    Sexual activity: Not on file   Other Topics Concern    Not on file   Social History Narrative    Not on file     Social Determinants of Health     Financial Resource Strain: Not on file   Food Insecurity: Not on file   Transportation Needs: Not on file   Physical Activity: Not on file   Stress: Not on file   Social Connections: Not on file   Intimate Partner Violence: Not on file

## 2023-05-05 ENCOUNTER — OFFICE VISIT (OUTPATIENT)
Dept: BARIATRICS/WEIGHT MGMT | Age: 38
End: 2023-05-05

## 2023-05-05 VITALS — WEIGHT: 293 LBS | BODY MASS INDEX: 45.99 KG/M2 | HEIGHT: 67 IN

## 2023-05-05 DIAGNOSIS — E66.01 MORBID OBESITY WITH BMI OF 50.0-59.9, ADULT (HCC): Primary | ICD-10-CM

## 2023-05-05 PROCEDURE — 99999 PR OFFICE/OUTPT VISIT,PROCEDURE ONLY: CPT

## 2023-05-05 NOTE — PROGRESS NOTES
OutpatientNutrition Counseling - Non-Surgical Weight Loss Program    REASON FOR VISIT:    Chief Complaint:    Chief Complaint   Patient presents with    Weight Management     WEIGH IN         OBJECTIVE:  Physical Exam   Ht 5' 7\" (1.702 m)   Wt (!) 341 lb 3.2 oz (154.8 kg)   BMI 53.44 kg/m²        Patient lost 8.4 pounds.

## 2023-05-12 ENCOUNTER — OFFICE VISIT (OUTPATIENT)
Dept: BARIATRICS/WEIGHT MGMT | Age: 38
End: 2023-05-12

## 2023-05-12 VITALS — BODY MASS INDEX: 45.99 KG/M2 | HEIGHT: 67 IN | WEIGHT: 293 LBS

## 2023-05-12 DIAGNOSIS — E66.01 MORBID OBESITY WITH BMI OF 50.0-59.9, ADULT (HCC): Primary | ICD-10-CM

## 2023-05-12 PROCEDURE — 99999 PR OFFICE/OUTPT VISIT,PROCEDURE ONLY: CPT

## 2023-05-12 NOTE — PROGRESS NOTES
OutpatientNutrition Counseling - Non-Surgical Weight Loss Program    REASON FOR VISIT:    Chief Complaint:    Chief Complaint   Patient presents with    Weight Management     Weigh In         OBJECTIVE:  Physical Exam   Ht 5' 7\" (1.702 m)   Wt (!) 343 lb (155.6 kg)   BMI 53.72 kg/m²                Patient gained 1.8lbs

## 2023-09-14 ENCOUNTER — OFFICE VISIT MH/BH (OUTPATIENT)
Dept: BARIATRICS/WEIGHT MGMT | Age: 38
End: 2023-09-14
Payer: COMMERCIAL

## 2023-09-14 ENCOUNTER — HOSPITAL ENCOUNTER (OUTPATIENT)
Age: 38
Discharge: HOME | End: 2023-09-14
Payer: COMMERCIAL

## 2023-09-14 VITALS
SYSTOLIC BLOOD PRESSURE: 136 MMHG | OXYGEN SATURATION: 98 % | HEART RATE: 86 BPM | DIASTOLIC BLOOD PRESSURE: 84 MMHG | BODY MASS INDEX: 45.99 KG/M2 | HEIGHT: 67 IN | WEIGHT: 293 LBS

## 2023-09-14 DIAGNOSIS — Z79.899 MEDICATION MANAGEMENT: ICD-10-CM

## 2023-09-14 DIAGNOSIS — E66.01 MORBID OBESITY WITH BMI OF 50.0-59.9, ADULT (HCC): Primary | ICD-10-CM

## 2023-09-14 LAB
ALBUMIN SERPL-MCNC: 4.2 GM/DL (ref 3.4–5)
ALP BLD-CCNC: 73 IU/L (ref 40–128)
ALT SERPL-CCNC: 23 U/L (ref 10–40)
AMPHETAMINES: NEGATIVE
ANION GAP SERPL CALCULATED.3IONS-SCNC: 14 MMOL/L (ref 4–16)
AST SERPL-CCNC: 20 IU/L (ref 15–37)
BARBITURATE SCREEN URINE: NEGATIVE
BASOPHILS ABSOLUTE: 0 K/CU MM
BASOPHILS RELATIVE PERCENT: 0.1 % (ref 0–1)
BENZODIAZEPINE SCREEN, URINE: NEGATIVE
BILIRUB SERPL-MCNC: 0.2 MG/DL (ref 0–1)
BUN SERPL-MCNC: 15 MG/DL (ref 6–23)
CALCIUM SERPL-MCNC: 8.9 MG/DL (ref 8.3–10.6)
CANNABINOID SCREEN URINE: NEGATIVE
CHLORIDE BLD-SCNC: 100 MMOL/L (ref 99–110)
CO2: 26 MMOL/L (ref 21–32)
COCAINE METABOLITE: NEGATIVE
CREAT SERPL-MCNC: 0.8 MG/DL (ref 0.6–1.1)
DIFFERENTIAL TYPE: ABNORMAL
EKG ATRIAL RATE: 81 BPM
EKG DIAGNOSIS: NORMAL
EKG P AXIS: 49 DEGREES
EKG P-R INTERVAL: 172 MS
EKG Q-T INTERVAL: 398 MS
EKG QRS DURATION: 84 MS
EKG QTC CALCULATION (BAZETT): 462 MS
EKG R AXIS: 51 DEGREES
EKG T AXIS: 46 DEGREES
EKG VENTRICULAR RATE: 81 BPM
EOSINOPHILS ABSOLUTE: 0.1 K/CU MM
EOSINOPHILS RELATIVE PERCENT: 0.9 % (ref 0–3)
FENTANYL URINE: NEGATIVE
GFR SERPL CREATININE-BSD FRML MDRD: >60 ML/MIN/1.73M2
GLUCOSE SERPL-MCNC: 96 MG/DL (ref 70–99)
HCT VFR BLD CALC: 39.6 % (ref 37–47)
HEMOGLOBIN: 12.7 GM/DL (ref 12.5–16)
IMMATURE NEUTROPHIL %: 0.2 % (ref 0–0.43)
LYMPHOCYTES ABSOLUTE: 2.1 K/CU MM
LYMPHOCYTES RELATIVE PERCENT: 22.9 % (ref 24–44)
MCH RBC QN AUTO: 28.5 PG (ref 27–31)
MCHC RBC AUTO-ENTMCNC: 32.1 % (ref 32–36)
MCV RBC AUTO: 88.8 FL (ref 78–100)
MONOCYTES ABSOLUTE: 0.6 K/CU MM
MONOCYTES RELATIVE PERCENT: 6.3 % (ref 0–4)
NUCLEATED RBC %: 0 %
OPIATES, URINE: NEGATIVE
OXYCODONE: NEGATIVE
PDW BLD-RTO: 13.9 % (ref 11.7–14.9)
PLATELET # BLD: 216 K/CU MM (ref 140–440)
PMV BLD AUTO: 10.3 FL (ref 7.5–11.1)
POTASSIUM SERPL-SCNC: 3.9 MMOL/L (ref 3.5–5.1)
RBC # BLD: 4.46 M/CU MM (ref 4.2–5.4)
SEGMENTED NEUTROPHILS ABSOLUTE COUNT: 6.4 K/CU MM
SEGMENTED NEUTROPHILS RELATIVE PERCENT: 69.6 % (ref 36–66)
SODIUM BLD-SCNC: 140 MMOL/L (ref 135–145)
TOTAL IMMATURE NEUTOROPHIL: 0.02 K/CU MM
TOTAL NUCLEATED RBC: 0 K/CU MM
TOTAL PROTEIN: 6.7 GM/DL (ref 6.4–8.2)
WBC # BLD: 9.1 K/CU MM (ref 4–10.5)

## 2023-09-14 PROCEDURE — 36415 COLL VENOUS BLD VENIPUNCTURE: CPT

## 2023-09-14 PROCEDURE — 99204 OFFICE O/P NEW MOD 45 MIN: CPT | Performed by: NURSE PRACTITIONER

## 2023-09-14 PROCEDURE — 80307 DRUG TEST PRSMV CHEM ANLYZR: CPT

## 2023-09-14 PROCEDURE — 85025 COMPLETE CBC W/AUTO DIFF WBC: CPT

## 2023-09-14 PROCEDURE — 80053 COMPREHEN METABOLIC PANEL: CPT

## 2023-09-14 RX ORDER — CHLORTHALIDONE 25 MG/1
25 TABLET ORAL DAILY
COMMUNITY
Start: 2023-07-07

## 2023-09-20 ENCOUNTER — OFFICE VISIT (OUTPATIENT)
Dept: BARIATRICS/WEIGHT MGMT | Age: 38
End: 2023-09-20
Payer: COMMERCIAL

## 2023-09-20 VITALS
OXYGEN SATURATION: 100 % | HEART RATE: 84 BPM | SYSTOLIC BLOOD PRESSURE: 122 MMHG | BODY MASS INDEX: 45.99 KG/M2 | HEIGHT: 67 IN | DIASTOLIC BLOOD PRESSURE: 74 MMHG | WEIGHT: 293 LBS

## 2023-09-20 DIAGNOSIS — E66.01 MORBID OBESITY WITH BMI OF 50.0-59.9, ADULT (HCC): Primary | ICD-10-CM

## 2023-09-20 DIAGNOSIS — Z79.899 MEDICATION MANAGEMENT: ICD-10-CM

## 2023-09-20 PROCEDURE — 99214 OFFICE O/P EST MOD 30 MIN: CPT | Performed by: NURSE PRACTITIONER

## 2023-09-20 RX ORDER — PHENTERMINE HYDROCHLORIDE 37.5 MG/1
37.5 TABLET ORAL
Qty: 30 TABLET | Refills: 0 | Status: SHIPPED | OUTPATIENT
Start: 2023-09-20 | End: 2023-10-20

## 2023-10-18 ENCOUNTER — OFFICE VISIT (OUTPATIENT)
Dept: BARIATRICS/WEIGHT MGMT | Age: 38
End: 2023-10-18
Payer: COMMERCIAL

## 2023-10-18 VITALS
HEIGHT: 67 IN | DIASTOLIC BLOOD PRESSURE: 76 MMHG | BODY MASS INDEX: 45.99 KG/M2 | SYSTOLIC BLOOD PRESSURE: 124 MMHG | HEART RATE: 89 BPM | OXYGEN SATURATION: 97 % | WEIGHT: 293 LBS

## 2023-10-18 DIAGNOSIS — Z79.899 MEDICATION MANAGEMENT: ICD-10-CM

## 2023-10-18 DIAGNOSIS — E66.01 MORBID OBESITY WITH BMI OF 50.0-59.9, ADULT (HCC): Primary | ICD-10-CM

## 2023-10-18 PROCEDURE — 99214 OFFICE O/P EST MOD 30 MIN: CPT | Performed by: NURSE PRACTITIONER

## 2023-10-18 RX ORDER — SENNOSIDES A AND B 8.6 MG/1
1 TABLET, FILM COATED ORAL 2 TIMES DAILY
Qty: 60 TABLET | Refills: 11 | Status: SHIPPED | OUTPATIENT
Start: 2023-10-18 | End: 2024-10-17

## 2023-10-18 RX ORDER — PHENTERMINE HYDROCHLORIDE 37.5 MG/1
37.5 TABLET ORAL
Qty: 30 TABLET | Refills: 0 | Status: SHIPPED | OUTPATIENT
Start: 2023-10-18 | End: 2023-11-17

## 2023-10-18 ASSESSMENT — ENCOUNTER SYMPTOMS: CONSTIPATION: 1

## 2023-11-16 ENCOUNTER — OFFICE VISIT (OUTPATIENT)
Dept: BARIATRICS/WEIGHT MGMT | Age: 38
End: 2023-11-16
Payer: COMMERCIAL

## 2023-11-16 VITALS
DIASTOLIC BLOOD PRESSURE: 90 MMHG | HEIGHT: 67 IN | WEIGHT: 293 LBS | OXYGEN SATURATION: 95 % | BODY MASS INDEX: 45.99 KG/M2 | HEART RATE: 91 BPM | SYSTOLIC BLOOD PRESSURE: 112 MMHG

## 2023-11-16 DIAGNOSIS — E66.01 MORBID OBESITY WITH BMI OF 50.0-59.9, ADULT (HCC): Primary | ICD-10-CM

## 2023-11-16 DIAGNOSIS — Z79.899 MEDICATION MANAGEMENT: ICD-10-CM

## 2023-11-16 PROCEDURE — 99214 OFFICE O/P EST MOD 30 MIN: CPT | Performed by: NURSE PRACTITIONER

## 2023-11-16 RX ORDER — DEXTROMETHORPHAN HYDROBROMIDE, GUAIFENESIN AND PSEUDOEPHEDRINE HYDROCHLORIDE 15; 400; 60 MG/1; MG/1; MG/1
1 TABLET ORAL EVERY 4 HOURS PRN
COMMUNITY
Start: 2023-11-11

## 2023-11-16 RX ORDER — PHENTERMINE HYDROCHLORIDE 37.5 MG/1
37.5 TABLET ORAL
Qty: 30 TABLET | Refills: 0 | Status: SHIPPED | OUTPATIENT
Start: 2023-11-16 | End: 2023-12-16

## 2023-11-16 ASSESSMENT — ENCOUNTER SYMPTOMS: COUGH: 1

## 2024-01-18 ENCOUNTER — OFFICE VISIT (OUTPATIENT)
Dept: BARIATRICS/WEIGHT MGMT | Age: 39
End: 2024-01-18
Payer: COMMERCIAL

## 2024-01-18 VITALS
SYSTOLIC BLOOD PRESSURE: 106 MMHG | OXYGEN SATURATION: 99 % | WEIGHT: 293 LBS | DIASTOLIC BLOOD PRESSURE: 88 MMHG | HEART RATE: 94 BPM | HEIGHT: 67 IN | BODY MASS INDEX: 45.99 KG/M2

## 2024-01-18 DIAGNOSIS — E66.01 MORBID OBESITY WITH BMI OF 45.0-49.9, ADULT (HCC): Primary | ICD-10-CM

## 2024-01-18 DIAGNOSIS — Z79.899 MEDICATION MANAGEMENT: ICD-10-CM

## 2024-01-18 PROCEDURE — 99214 OFFICE O/P EST MOD 30 MIN: CPT | Performed by: NURSE PRACTITIONER

## 2024-01-18 RX ORDER — ESCITALOPRAM OXALATE 20 MG/1
20 TABLET ORAL DAILY
COMMUNITY
Start: 2024-01-10

## 2024-01-18 RX ORDER — PHENTERMINE HYDROCHLORIDE 37.5 MG/1
37.5 TABLET ORAL
Qty: 30 TABLET | Refills: 0 | Status: SHIPPED | OUTPATIENT
Start: 2024-01-18 | End: 2024-02-17

## 2024-01-18 NOTE — PROGRESS NOTES
Chief Complaint   Patient presents with    Weight Management     Med WM Adipex # 5         SUBJECTIVE:    HPI: Patient is here with complaints of: Monthly Adipex visit.    Obesity with a BMI of Body mass index is 48.19 kg/m²..    Current weight: 303 pounds     Weight change since last visit: -5.9 pounds (if pt failed to lose weight, cannot remain on medication).    Month 5 of being on Adipex.     Any new absolute contraindications (glaucoma, drug abuse, CAD, uncontrolled HTN, arrhythmias, stroke, CHF, hyperthyroidism, MAOI use, pregnant or breastfeeding) to being on medication: DENIES     Pt complains of the following side effects: DENIES    Current dietary measures: has restarted tracking calories about 1400 daily    Current exercise measures: limited     I have reviewed the patient's(pertinent information to this visit) medical history, family history(scanned in  the [employer] under \"patient questioner\"), social history and review of systems with the patient today in the office.          Past Surgical History:   Procedure Laterality Date    ARM SURGERY Right     TONSILLECTOMY         Past Medical History:   Diagnosis Date    Bacterial meningitis     COVID-19     Depression        History reviewed. No pertinent family history.    Social History     Socioeconomic History    Marital status:      Spouse name: Not on file    Number of children: Not on file    Years of education: Not on file    Highest education level: Not on file   Occupational History    Not on file   Tobacco Use    Smoking status: Never    Smokeless tobacco: Never   Vaping Use    Vaping Use: Former   Substance and Sexual Activity    Alcohol use: Yes     Comment: Rarely    Drug use: No    Sexual activity: Not on file   Other Topics Concern    Not on file   Social History Narrative    Not on file     Social Determinants of Health     Financial Resource Strain: Not on file   Food Insecurity: Not on file   Transportation Needs: Not on file

## 2024-02-16 ENCOUNTER — OFFICE VISIT (OUTPATIENT)
Dept: BARIATRICS/WEIGHT MGMT | Age: 39
End: 2024-02-16
Payer: COMMERCIAL

## 2024-02-16 VITALS
HEIGHT: 67 IN | WEIGHT: 293 LBS | HEART RATE: 103 BPM | OXYGEN SATURATION: 96 % | DIASTOLIC BLOOD PRESSURE: 78 MMHG | SYSTOLIC BLOOD PRESSURE: 114 MMHG | BODY MASS INDEX: 45.99 KG/M2

## 2024-02-16 DIAGNOSIS — Z79.899 MEDICATION MANAGEMENT: ICD-10-CM

## 2024-02-16 DIAGNOSIS — E66.01 MORBID OBESITY WITH BMI OF 45.0-49.9, ADULT (HCC): Primary | ICD-10-CM

## 2024-02-16 PROCEDURE — 99214 OFFICE O/P EST MOD 30 MIN: CPT | Performed by: NURSE PRACTITIONER

## 2024-02-16 RX ORDER — PHENTERMINE HYDROCHLORIDE 37.5 MG/1
37.5 TABLET ORAL
Qty: 30 TABLET | Refills: 0 | Status: SHIPPED | OUTPATIENT
Start: 2024-02-16 | End: 2024-03-17

## 2024-02-16 NOTE — PROGRESS NOTES
Chief Complaint   Patient presents with    Weight Management     F/U Medication WM Visit - Adipex #6  Has Bar Cov - Jan         SUBJECTIVE:    HPI: Patient is here with complaints of: Monthly Adipex visit.    Obesity with a BMI of Body mass index is 47.7 kg/m²..    Current weight: 300 pounds    Weight change since last visit: -3.1 pounds (if pt failed to lose weight, cannot remain on medication).    Month 6 of being on Adipex.     Any new absolute contraindications (glaucoma, drug abuse, CAD, uncontrolled HTN, arrhythmias, stroke, CHF, hyperthyroidism, MAOI use, pregnant or breastfeeding) to being on medication: DENIES     Pt complains of the following side effects: DENIES    Current dietary measures: tracking calories around 1300; protein good; water intake improved; energy greatly improved    Current exercise measures: walking more    I have reviewed the patient's(pertinent information to this visit) medical history, family history(scanned in  the Med[facility] under \"patient questioner\"), social history and review of systems with the patient today in the office.          Past Surgical History:   Procedure Laterality Date    ARM SURGERY Right     TONSILLECTOMY         Past Medical History:   Diagnosis Date    Bacterial meningitis     COVID-19     Depression        History reviewed. No pertinent family history.    Social History     Socioeconomic History    Marital status:      Spouse name: Not on file    Number of children: Not on file    Years of education: Not on file    Highest education level: Not on file   Occupational History    Not on file   Tobacco Use    Smoking status: Never    Smokeless tobacco: Never   Vaping Use    Vaping Use: Former   Substance and Sexual Activity    Alcohol use: Yes     Comment: Rarely    Drug use: No    Sexual activity: Not on file   Other Topics Concern    Not on file   Social History Narrative    Not on file     Social Determinants of Health     Financial Resource Strain:

## 2024-03-15 ENCOUNTER — TELEPHONE (OUTPATIENT)
Dept: BARIATRICS/WEIGHT MGMT | Age: 39
End: 2024-03-15

## 2024-03-15 ENCOUNTER — OFFICE VISIT (OUTPATIENT)
Dept: BARIATRICS/WEIGHT MGMT | Age: 39
End: 2024-03-15
Payer: COMMERCIAL

## 2024-03-15 VITALS
HEIGHT: 67 IN | WEIGHT: 293 LBS | HEART RATE: 97 BPM | BODY MASS INDEX: 45.99 KG/M2 | SYSTOLIC BLOOD PRESSURE: 118 MMHG | OXYGEN SATURATION: 98 % | DIASTOLIC BLOOD PRESSURE: 90 MMHG

## 2024-03-15 DIAGNOSIS — E66.01 MORBID OBESITY WITH BMI OF 45.0-49.9, ADULT (HCC): Primary | ICD-10-CM

## 2024-03-15 DIAGNOSIS — Z79.899 MEDICATION MANAGEMENT: ICD-10-CM

## 2024-03-15 PROCEDURE — 99214 OFFICE O/P EST MOD 30 MIN: CPT | Performed by: NURSE PRACTITIONER

## 2024-03-15 RX ORDER — TIRZEPATIDE 2.5 MG/.5ML
2.5 INJECTION, SOLUTION SUBCUTANEOUS WEEKLY
Qty: 0.5 ML | Refills: 1 | Status: SHIPPED | OUTPATIENT
Start: 2024-03-15

## 2024-03-15 RX ORDER — PHENTERMINE HYDROCHLORIDE 37.5 MG/1
37.5 TABLET ORAL
Qty: 30 TABLET | Refills: 0 | Status: SHIPPED | OUTPATIENT
Start: 2024-03-15 | End: 2024-04-14

## 2024-03-15 ASSESSMENT — PATIENT HEALTH QUESTIONNAIRE - PHQ9
SUM OF ALL RESPONSES TO PHQ QUESTIONS 1-9: 0
2. FEELING DOWN, DEPRESSED OR HOPELESS: 0
SUM OF ALL RESPONSES TO PHQ QUESTIONS 1-9: 0
SUM OF ALL RESPONSES TO PHQ QUESTIONS 1-9: 0
SUM OF ALL RESPONSES TO PHQ9 QUESTIONS 1 & 2: 0
SUM OF ALL RESPONSES TO PHQ QUESTIONS 1-9: 0
1. LITTLE INTEREST OR PLEASURE IN DOING THINGS: 0

## 2024-03-15 NOTE — PROGRESS NOTES
Chief Complaint   Patient presents with    Weight Management     Med WM Adipex 7         SUBJECTIVE:    HPI: Patient is here with complaints of: Monthly Adipex visit.    Obesity with a BMI of Body mass index is 47.35 kg/m²..    Current weight: 297 pounds    Weight change since last visit: -2.2 pounds (if pt failed to lose weight, cannot remain on medication).    Month 7 of being on Adipex.     Any new absolute contraindications (glaucoma, drug abuse, CAD, uncontrolled HTN, arrhythmias, stroke, CHF, hyperthyroidism, MAOI use, pregnant or breastfeeding) to being on medication: DENIES     Pt complains of the following side effects: DENIES    Current dietary measures: slightly more carbs than usual lately; tracking calories most days around 1300    Current exercise measures: some walking    I have reviewed the patient's(pertinent information to this visit) medical history, family history(scanned in  the [employer] under \"patient questioner\"), social history and review of systems with the patient today in the office.          Past Surgical History:   Procedure Laterality Date    ARM SURGERY Right     TONSILLECTOMY         Past Medical History:   Diagnosis Date    Bacterial meningitis     COVID-19     Depression        History reviewed. No pertinent family history.    Social History     Socioeconomic History    Marital status:      Spouse name: Not on file    Number of children: Not on file    Years of education: Not on file    Highest education level: Not on file   Occupational History    Not on file   Tobacco Use    Smoking status: Never    Smokeless tobacco: Never   Vaping Use    Vaping Use: Former   Substance and Sexual Activity    Alcohol use: Yes     Comment: Rarely    Drug use: No    Sexual activity: Not on file   Other Topics Concern    Not on file   Social History Narrative    Not on file     Social Determinants of Health     Financial Resource Strain: Not on file   Food Insecurity: Not on file

## 2024-03-15 NOTE — TELEPHONE ENCOUNTER
Pt called clinic and stated that to get Zepbound filled at the pharmacy, it will cost her $500 with her co-pay, but she was previously approved for Wegovy with a $25 co-pay. Does a new prior authorization need to be completed to get Zepbound for the same price as the Wegovy since they are essentially the same medication. Please advise.

## 2024-04-17 ENCOUNTER — OFFICE VISIT (OUTPATIENT)
Dept: BARIATRICS/WEIGHT MGMT | Age: 39
End: 2024-04-17
Payer: COMMERCIAL

## 2024-04-17 VITALS
SYSTOLIC BLOOD PRESSURE: 118 MMHG | HEART RATE: 77 BPM | WEIGHT: 293 LBS | HEIGHT: 67 IN | BODY MASS INDEX: 45.99 KG/M2 | OXYGEN SATURATION: 98 % | DIASTOLIC BLOOD PRESSURE: 80 MMHG

## 2024-04-17 DIAGNOSIS — Z79.899 MEDICATION MANAGEMENT: ICD-10-CM

## 2024-04-17 DIAGNOSIS — E66.01 MORBID OBESITY WITH BMI OF 45.0-49.9, ADULT (HCC): Primary | ICD-10-CM

## 2024-04-17 PROCEDURE — 99214 OFFICE O/P EST MOD 30 MIN: CPT | Performed by: NURSE PRACTITIONER

## 2024-04-17 RX ORDER — SEMAGLUTIDE 0.5 MG/.5ML
0.5 INJECTION, SOLUTION SUBCUTANEOUS
Qty: 2 ML | Refills: 1 | Status: SHIPPED | OUTPATIENT
Start: 2024-04-17

## 2024-04-17 RX ORDER — PHENTERMINE HYDROCHLORIDE 37.5 MG/1
37.5 TABLET ORAL
Qty: 30 TABLET | Refills: 0 | Status: SHIPPED | OUTPATIENT
Start: 2024-04-17 | End: 2024-05-17

## 2024-04-17 NOTE — PROGRESS NOTES
file   Social Connections: Not on file   Intimate Partner Violence: Not on file   Housing Stability: Not on file       Current Outpatient Medications   Medication Sig Dispense Refill    Tirzepatide-Weight Management (ZEPBOUND) 2.5 MG/0.5ML SOAJ Inject 2.5 mg into the skin once a week 0.5 mL 1    escitalopram (LEXAPRO) 20 MG tablet Take 1 tablet by mouth daily      senna (SENOKOT) 8.6 MG tablet Take 1 tablet by mouth 2 times daily 60 tablet 11    chlorthalidone (HYGROTON) 25 MG tablet Take 1 tablet by mouth daily       No current facility-administered medications for this visit.        Allergies   Allergen Reactions    Penicillins        Review of Systems:         Review of Systems   All other systems reviewed and are negative.        OBJECTIVE:  Physical Exam:    /80   Pulse 77   Ht 1.689 m (5' 6.5\")   Wt 135.8 kg (299 lb 4.8 oz)   SpO2 98%   BMI 47.58 kg/m²      Physical Exam  Constitutional:       Appearance: Normal appearance. She is obese.   HENT:      Head: Normocephalic.      Nose: Nose normal.      Mouth/Throat:      Pharynx: Oropharynx is clear.   Eyes:      Pupils: Pupils are equal, round, and reactive to light.   Cardiovascular:      Rate and Rhythm: Normal rate.      Pulses: Normal pulses.   Pulmonary:      Effort: Pulmonary effort is normal.   Musculoskeletal:         General: Normal range of motion.      Cervical back: Normal range of motion.   Skin:     General: Skin is warm and dry.      Capillary Refill: Capillary refill takes less than 2 seconds.   Neurological:      General: No focal deficit present.      Mental Status: She is alert and oriented to person, place, and time.   Psychiatric:         Mood and Affect: Mood normal.         Behavior: Behavior normal.           ASSESSMENT:  1. Morbid obesity with BMI of 45.0-49.9, adult (HCC)  - Start tracking calories; about 6507-1819 daily  - 64 oz water intake daily  - Increase activity as tolerated    2. Medication 
every 7 days     Dispense:  2 mL     Refill:  1    phentermine (ADIPEX-P) 37.5 MG tablet     Sig: Take 1 tablet by mouth every morning (before breakfast) for 30 days. BMI 44. Max Daily Amount: 37.5 mg     Dispense:  30 tablet     Refill:  0        Follow Up:  Return in about 1 month (around 5/17/2024).      Marly Hernandez, APRN - CNP

## 2024-05-15 ENCOUNTER — OFFICE VISIT (OUTPATIENT)
Dept: BARIATRICS/WEIGHT MGMT | Age: 39
End: 2024-05-15
Payer: COMMERCIAL

## 2024-05-15 VITALS
WEIGHT: 288.5 LBS | SYSTOLIC BLOOD PRESSURE: 110 MMHG | BODY MASS INDEX: 45.28 KG/M2 | DIASTOLIC BLOOD PRESSURE: 78 MMHG | OXYGEN SATURATION: 95 % | HEIGHT: 67 IN | HEART RATE: 84 BPM

## 2024-05-15 DIAGNOSIS — Z79.899 MEDICATION MANAGEMENT: ICD-10-CM

## 2024-05-15 DIAGNOSIS — E66.01 MORBID OBESITY WITH BMI OF 45.0-49.9, ADULT (HCC): Primary | ICD-10-CM

## 2024-05-15 PROCEDURE — 99214 OFFICE O/P EST MOD 30 MIN: CPT | Performed by: NURSE PRACTITIONER

## 2024-05-15 RX ORDER — SEMAGLUTIDE 1 MG/.5ML
1 INJECTION, SOLUTION SUBCUTANEOUS
Qty: 2 ML | Refills: 1 | Status: SHIPPED | OUTPATIENT
Start: 2024-05-15

## 2024-05-15 ASSESSMENT — ENCOUNTER SYMPTOMS: DIARRHEA: 1

## 2024-05-15 NOTE — PROGRESS NOTES
Chief Complaint   Patient presents with    Weight Management     Med WM Adipex # 9 and Wegovy         SUBJECTIVE:    HPI: Patient is here with complaints of weight management.    Obesity with a BMI of Body mass index is 45.87 kg/m²..    Any history of personal or family medullary thyroid cancinoma (MTC) or multiple endocrine neoplasia type 2 (MEN 2)? Denies (black box warning).    Any current or history suicidal attempts or active suicidal ideation? Denies     Any absolute contraindications such as: renal impairment, hepatic impairment, pancreatitis, or gastroparesis? Denies    Current weight: 288 pounds     Weight change since last visit: -10.8 pounds (if pt failed to lose weight, cannot remain on medication).    Month 2 of being on Wegovy    Pt complains of the following side effects: diarrhea with intiation of Wegovy    Current dietary measures: more convenience foods than usual per increased stress - patient at hospital with her dad    Current exercise measures: some walking    I have reviewed the patient's(pertinent information to this visit) medical history, family history(scanned in  the [employer] under \"patient questioner\"), social history and review of systems with the patient today in the office.          Past Surgical History:   Procedure Laterality Date    ARM SURGERY Right     TONSILLECTOMY         Past Medical History:   Diagnosis Date    Bacterial meningitis     COVID-19     Depression        History reviewed. No pertinent family history.    Social History     Socioeconomic History    Marital status:      Spouse name: Not on file    Number of children: Not on file    Years of education: Not on file    Highest education level: Not on file   Occupational History    Not on file   Tobacco Use    Smoking status: Never    Smokeless tobacco: Never   Vaping Use    Vaping Use: Former   Substance and Sexual Activity    Alcohol use: Yes     Comment: Rarely    Drug use: No    Sexual activity: Not on file

## 2024-07-15 RX ORDER — SEMAGLUTIDE 1 MG/.5ML
INJECTION, SOLUTION SUBCUTANEOUS
Qty: 2 ML | Refills: 1 | OUTPATIENT
Start: 2024-07-15

## 2024-10-03 NOTE — PROGRESS NOTES
Goal Outcome Evaluation:  Plan of Care Reviewed With: patient        Progress: no change  Outcome Evaluation: Patient resting in bed during shift. VSS on room air. Patient requested PRN tylenol, see MAR. Patient ambulated to bathroom during shift with x1 assist. No acute changes noted at this time. Will continue with plan of care.                                Medication management  - Would benefit from Adipex, Qsymia or Wegovy  [facility] on back order  - Baseline workup ordered  - RTC 2 weeks     - Comprehensive Metabolic Panel; Future  - CBC with Auto Differential; Future  - Urine Drug Screen; Future  - EKG 12 Lead; Future        PLAN:    -Check EKG, UDS, CBC, and CMP prior to starting. Any abnormalities will be addressed prior to starting medication .    -Will plan on starting Adipex with next visit if Labs, UDS, and EKG WNL.     -BMI is over 30, or over 27 with weight-related risk factors. - Patient aware that weight must be lost at each visit or medication will be discontinued. - Patient must lose 5% of body weight every 3 months to remain on medication.     -Pt is aware that in order to be successful, must combine Adipex with appropriate diet and exercise plan. -Pt provided with medication handout that covers use; side effects (common side effects include insomnia, dry mouth, constipation, nervousness, increased heart rate, hypertension); precautions; and interactions.     -Pt aware must meet monthly in person while on this medication.     -Check [employer] Rx Reporting System. Any concerns: NONE Reported     - Current birth control measures include:  had vasectomy    -If elderly or renal impairment, will use lower dose (15 mg daily) and avoid all together if GFR is less than 15. N/A       Orders Placed This Encounter   Procedures    Comprehensive Metabolic Panel    CBC with Auto Differential    Urine Drug Screen    Amb Referral to Nutrition Services    EKG 12 Lead        No orders of the defined types were placed in this encounter. Follow Up:  Return in about 2 weeks (around 9/28/2023).       WILLIAMS Sanderson - CNP

## 2024-11-15 NOTE — H&P
Colorectal Surgery Progress Note      Wade Machado is a 65 y.o. male with a past medical history of diverticulitis with colovesical fistula, now hospital day 2 s/p Lx sigmoid resection , takedown fistula with DLI.     Subjective    No acute events overnight, afebrile with stable vital signs.  Complained of some intermittent nausea despite NGT tube, ostomy started to function and had two bag leaks overnight.  Total from ostomy was 1850 ml of thin bilious liquid.  Pain moderately controlled.        Objective    Vital signs:   Vitals:    11/15/24 0826   BP: 131/84   Pulse: 91   Resp: 16   Temp: 36 °C (96.8 °F)   SpO2: 93%        Physical Exam  GEN: lying in bed, awake this am, conversive, no acute distress  HEENT: Sclera anicteric. Moist mucous membranes.  RESP: chest expansion symmetrical, unlabored on  On RA  CV: Regular rate, normotensive  GI: Abdomen soft, minimal distention, NGT with small amount of dark brown in canister,  Lap sites and surgical incision c/d/I stoma well pouched, pink and well perfused, large amount of bilious liquid in pouch  : Moeller in place with sufficient  urine.  MSK: No gross deformities. Moves all extremities spontaneously.  NEURO: Alert and oriented x3. No focal deficits.  PSYCH: Appropriate mood and affect.  SKIN: No rashes or lesions.    I/O last 2 completed shifts:  In: 1418.7 (13.3 mL/kg) [I.V.:1218.7 (11.4 mL/kg); IV Piggyback:200]  Out: 4200 (39.4 mL/kg) [Urine:700 (0.3 mL/kg/hr); Emesis/NG output:1650; Stool:1850]  Weight: 106.5 kg      Lines/Drains/Tubes:   Patient Lines/Drains/Airways Status       Active Airway       None                  Output by Drain (mL) 11/13/24 0700 - 11/13/24 1859 11/13/24 1900 - 11/14/24 0659 11/14/24 0700 - 11/14/24 1859 11/14/24 1900 - 11/15/24 0659 11/15/24 0700 - 11/15/24 0935   Requested LDAs do not have output data documented.        Labs Past 18 Hours:  Recent Results (from the past 18 hours)   POCT GLUCOSE    Collection Time: 11/15/24   6:09 AM   Result Value Ref Range    POCT Glucose 133 (H) 74 - 99 mg/dL   Basic Metabolic Panel    Collection Time: 11/15/24  7:02 AM   Result Value Ref Range    Glucose 114 (H) 74 - 99 mg/dL    Sodium 141 136 - 145 mmol/L    Potassium 3.7 3.5 - 5.3 mmol/L    Chloride 99 98 - 107 mmol/L    Bicarbonate 25 21 - 32 mmol/L    Anion Gap 21 (H) 10 - 20 mmol/L    Urea Nitrogen 26 (H) 6 - 23 mg/dL    Creatinine 0.70 0.50 - 1.30 mg/dL    eGFR >90 >60 mL/min/1.73m*2    Calcium 9.9 8.6 - 10.6 mg/dL   CBC    Collection Time: 11/15/24  7:02 AM   Result Value Ref Range    WBC 16.4 (H) 4.4 - 11.3 x10*3/uL    nRBC 0.0 0.0 - 0.0 /100 WBCs    RBC 5.34 4.50 - 5.90 x10*6/uL    Hemoglobin 15.7 13.5 - 17.5 g/dL    Hematocrit 49.3 41.0 - 52.0 %    MCV 92 80 - 100 fL    MCH 29.4 26.0 - 34.0 pg    MCHC 31.8 (L) 32.0 - 36.0 g/dL    RDW 17.2 (H) 11.5 - 14.5 %    Platelets 269 150 - 450 x10*3/uL        Meds:    Current Facility-Administered Medications:     calcium carbonate (Tums) chewable tablet 500 mg, 500 mg, oral, Daily, Kacy Shukla, APRN-CNP, 500 mg at 11/12/24 1612    cefTRIAXone (Rocephin) 1 g in dextrose (iso) IV 50 mL, 1 g, intravenous, q24h, Anabella Lemus MD, Stopped at 11/14/24 1848    [Held by provider] finasteride (Proscar) tablet 5 mg, 5 mg, oral, Daily, Anabella Lemus MD, 5 mg at 11/12/24 0819    [Held by provider] gabapentin (Neurontin) capsule 100 mg, 100 mg, oral, TID, Anabella Lemus MD, 100 mg at 11/12/24 1442    HYDROmorphone (Dilaudid) injection 0.2 mg, 0.2 mg, intravenous, q2h PRN, COLTON Shaw-CNP    HYDROmorphone (Dilaudid) injection 0.4 mg, 0.4 mg, intravenous, q2h PRN, DARIA Shaw, 0.4 mg at 11/13/24 8964    lidocaine 2 % mucosal jelly (Uro-Jet) 1 Application, 1 Application, Topical, Once, Kali Greer MD    [Held by provider] lisinopril tablet 40 mg, 40 mg, oral, Daily, Anabella Lemus MD    [Held by provider] metoprolol succinate XL (Toprol-XL) 24 hr tablet 25 mg, 25 mg,  Difficulty of Paying Living Expenses:    Food Insecurity:     Worried About Running Out of Food in the Last Year:     [address] in the Last Year:    Transportation Needs:     Lack of Transportation (Medical):  Lack of Transportation (Non-Medical):    Physical Activity:     Days of Exercise per Week:     Minutes of Exercise per Session:    Stress:     Feeling of Stress :    Social Connections:     Frequency of Communication with Friends and Family:     Frequency of Social Gatherings with Friends and Family:     Attends Mandaen Services:     Active Member of Clubs or Organizations:     Attends Club or Organization Meetings:     Marital Status:    Intimate Partner Violence:     Fear of Current or Ex-Partner:     Emotionally Abused:     Physically Abused:     Sexually Abused:        MEDICATIONS   Medications Prior to Admission  Medications Prior to Admission: escitalopram (LEXAPRO) 10 MG tablet, Take 10 mg by mouth daily  albuterol sulfate HFA (VENTOLIN HFA) 108 (90 Base) MCG/ACT inhaler, Inhale 2 puffs into the lungs 4 times daily as needed for Wheezing or Shortness of Breath  azithromycin (ZITHROMAX) 250 MG tablet, Take 2 tablets (500 mg) on Day 1, followed by 1 tablet (250 mg) once daily on Days 2 through 5.     Current Medications  Current Facility-Administered Medications   Medication Dose Route Frequency Provider Last Rate Last Admin    sodium chloride flush 0.9 % injection 5-40 mL  5-40 mL Intravenous 2 times per day Philomena Paget V, MD        sodium chloride flush 0.9 % injection 5-40 mL  5-40 mL Intravenous PRN Reno Trupti Holman MD        0.9 % sodium chloride infusion  25 mL Intravenous PRN Reno Trupti Holman MD        enoxaparin (LOVENOX) injection 30 mg  30 mg Subcutaneous BID Reno Trupti Holman MD        promethazine (PHENERGAN) tablet 12.5 mg  12.5 mg Oral Q6H PRN Reno Trupti Holman MD        Or    ondansetron (ZOFRAN) injection 4 mg  4 mg Intravenous Q6H PRN Malka Dubon MD       [facility] oral, Daily, Anabella Lemus MD, 25 mg at 11/12/24 0819    metoprolol tartrate (Lopressor) injection 2.5 mg, 2.5 mg, intravenous, q6h, COLTON Shaw-CNP, 2.5 mg at 11/15/24 0534    naloxone (Narcan) injection 0.2 mg, 0.2 mg, intravenous, q5 min PRN, Anabella Lemus MD    ondansetron ODT (Zofran-ODT) disintegrating tablet 4 mg, 4 mg, oral, q8h PRN **OR** ondansetron (Zofran) injection 4 mg, 4 mg, intravenous, q8h PRN, Anabella Lemus MD, 4 mg at 11/12/24 1532    oxybutynin (Ditropan) tablet 5 mg, 5 mg, oral, TID PRN, Anabella Lemus MD    pantoprazole (ProtoNix) injection 40 mg, 40 mg, intravenous, Daily, Kali Greer MD, 40 mg at 11/14/24 1727    phenoL (Chloraseptic) 1.4 % mouth/throat spray 1 spray, 1 spray, Mouth/Throat, q2h PRN, Kali Greer MD    sodium chloride 0.9% infusion, 40 mL/hr, intravenous, Continuous, Patito Alonzo MD, Last Rate: 40 mL/hr at 11/14/24 2108, 40 mL/hr at 11/14/24 2108    [Held by provider] tamsulosin (Flomax) 24 hr capsule 0.4 mg, 0.4 mg, oral, Daily, Anabella Lemus MD, 0.4 mg at 11/12/24 0819       Medications reviewed.  Vital signs reviewed.  Labs reviewed.             Assessment/Plan    Wade Machado is a 65 y.o. male with a past medical history of diverticulitis with colovesical fistula, now hospital day 1 s/p Lx sigmoid resection , takedown fistula with DLI     Plan Today:   - gravity trial today  - once removed clear liquids as tolerated  - Moeller stays til POD #6     Neuro: Post op pain is moderately controlled  - Tylenol  per ERAS  - multimodal pain control with: IV dilaudid- Convert to oral pain meds once tube out  - Sleep hygiene, encourage shades up during day, limited inactions at night as appropriate     Cardiac: Hemodynamically stable, Hx of HTN  -Q 4 VS         - home metoprolol converted to IV- will convert back to oral  - continue to hold home lisinopril                                                                                         polyethylene glycol (GLYCOLAX) packet 17 g  17 g Oral Daily PRN Reno Zepeda MD        acetaminophen (TYLENOL) tablet 650 mg  650 mg Oral Q6H PRN Reno Zepeda MD        Or    acetaminophen (TYLENOL) suppository 650 mg  650 mg Rectal Q6H PRN Reno Zepeda MD        guaiFENesin-dextromethorphan (ROBITUSSIN DM) 100-10 MG/5ML syrup 5 mL  5 mL Oral Q4H PRN Reno Zepeda MD        dexamethasone (DECADRON) tablet 6 mg  6 mg Oral Daily Reno Zepeda MD        levoFLOXacin (LEVAQUIN) 500 MG/100ML infusion 500 mg  500 mg Intravenous Q24H Reno Zepeda MD             Allergies  Allergies   Allergen Reactions    Penicillins        REVIEW OF SYSTEMS   Within above limitations. 14 point review of systems reviewed. Pertinent positive or negative as per HPI or otherwise negative per 14 point systems review. PHYSICAL EXAM     Wt Readings from Last 3 Encounters:   05/30/21 (!) 315 lb (142.9 kg)   05/26/21 (!) 315 lb (142.9 kg)   04/01/21 (!) 311 lb (141.1 kg)       Blood pressure (!) 164/113, pulse 100, temperature 98.9 °F (37.2 °C), temperature source Oral, resp. rate 18, height 5' 7\" (1.702 m), last menstrual period 05/23/2021, SpO2 94 %. General - AAO x 3, obese  Psych - Appropriate affect/speech. No agitation  Eyes - Eye lids intact. No scleral icterus  ENT - Lips wnl. External ear clear/dry/intact. No thyromegaly on inspection  Neuro - No gross peripheral or central neuro deficits on inspection  Heart - Sinus. RRR. S1 and S2 present. No added HS/murmurs appreciated. No elevated JVD appreciated  Lung - Adequate air entry b/l,   GI - Soft. No guarding/rigidity. No hepatosplenomegaly/ascites. BS+   - No CVA/suprapubic tenderness or palpable bladder distension  Skin - Intact. No rash/petechiae/ecchymosis. Warm extremities  MSK - Joints with normal ROM.  No joint swellings    Lines/Drains/Airways/Wounds:  [unfilled]    LABS AND IMAGING   CBC  [unfilled]    Last 3 Hemoglobin  Lab Results   Component Value                                                                Pulm: no acute issues, history of no significant pulmonary history  - Incentive spirometry Q 1 hours  - Encourage Ambulation/OOB     GI: Hx of  diverticulitis with colovesical fistula, S/P Lx sig takedown fistula and DLI  - NPO until NG out  - Gravity trial  - clears after NG out  - Juvin TID  - Zofran prn for nausea  - Wound and ostomy nurses consulted for supplies, education and support  - encourage patient to record output, empty bag  - GUM TID     : Hx BPH, continues with indwelling llamas until POD 6 secondary to colovesical fistula  - Strict intake and output  - Trend renal function and replace electrolytes as needed  - continue home Flomax and Proscar once taking po  - continue oxybutynin     HEME- H/H stable, no evidence of active bleeding, history of no significant hematological history  - trend CBC  - in indication for transfusion at this time     Endo no acute issues, history of no significant endocrine history   - no issues, no indication for SSI     ID afebrile, no leukocytosis,   - Continue to trend Temp per floor routine, trend CBC prn  - Continues on Ceftriaxone for UTI in setting of colovesical fistula- completed course today     Proph - Continue SCDs, Lovenox for DVT prevention     Dispo:  - Continue regular nursing floor  - PT  - plan at discharge... plan for HC for new stoma        Hospital Day: 4         Seen and discussed with surgical team    SOMMER SEGURA-CNP  Colorectal Surgery NP #[id number]  [place] # [id number]     Date    HGB 17.3 05/30/2021    HGB 13.3 05/26/2021    HGB 13.9 02/27/2012     Last 3 WBC/ANC  Lab Results   Component Value Date    WBC 4.7 05/30/2021    WBC 3.9 05/26/2021    WBC 26.0 02/27/2012     No components found for: GRNLOCTYABS  Last 3 Platelets  No results found for: PLATELET  Chemistry  [unfilled]  [unfilled]  No results found for: LDH  Coagulation Studies  No results found for: PTT, INR  Liver Function Studies  Lab Results   Component Value Date    ALT 41 05/30/2021    AST 40 05/30/2021    ALKPHOS 70 05/30/2021       Recent Imaging        Relevant labs and imaging reviewed    Lemuel is a 43 y.o. female p/w    Cough and fever likely d/t COVID  - CXR with infiltrates  - levoquin, c/w if procal elevated and pending hospital course  -COVID-19 testing and related labs: CRP, d-dimer, ferritin, fibrinogen, LDH, lactate, procalcitonin, viral panel  - sputum cuture, , legionella antigen, strep pneumonia antigen  - decadron  - PRN O2 via NC  - pulmonary consult/ID if needed  -Continuous pulse ox    Obesity  Counseled for weight loss    Depression/anxiety  lexapro      Case d/w ED provider    DVT ppx: lovenox  Code status: full    [facility], Internal Medicine  5/30/2021 at 9:11 PM

## 2025-02-10 ENCOUNTER — OFFICE VISIT (OUTPATIENT)
Dept: BARIATRICS/WEIGHT MGMT | Age: 40
End: 2025-02-10
Payer: COMMERCIAL

## 2025-02-10 VITALS
BODY MASS INDEX: 45.99 KG/M2 | OXYGEN SATURATION: 98 % | SYSTOLIC BLOOD PRESSURE: 110 MMHG | WEIGHT: 293 LBS | DIASTOLIC BLOOD PRESSURE: 100 MMHG | HEART RATE: 89 BPM | HEIGHT: 67 IN

## 2025-02-10 DIAGNOSIS — E66.01 MORBID OBESITY WITH BMI OF 50.0-59.9, ADULT: Primary | ICD-10-CM

## 2025-02-10 DIAGNOSIS — Z79.899 MEDICATION MANAGEMENT: ICD-10-CM

## 2025-02-10 PROCEDURE — G8417 CALC BMI ABV UP PARAM F/U: HCPCS | Performed by: NURSE PRACTITIONER

## 2025-02-10 PROCEDURE — 1036F TOBACCO NON-USER: CPT | Performed by: NURSE PRACTITIONER

## 2025-02-10 PROCEDURE — G8427 DOCREV CUR MEDS BY ELIG CLIN: HCPCS | Performed by: NURSE PRACTITIONER

## 2025-02-10 PROCEDURE — 99214 OFFICE O/P EST MOD 30 MIN: CPT | Performed by: NURSE PRACTITIONER

## 2025-02-10 RX ORDER — TIRZEPATIDE 2.5 MG/.5ML
2.5 INJECTION, SOLUTION SUBCUTANEOUS WEEKLY
Qty: 0.5 ML | Refills: 0 | Status: SHIPPED | OUTPATIENT
Start: 2025-02-10

## 2025-02-10 NOTE — PROGRESS NOTES
Chief Complaint   Patient presents with    Weight Management     Restart Med WM - Last OV 5/15/2024, previously on Adipex and Wegovy         SUBJECTIVE:    HPI: Patient is here with complaints of weight management.    Obesity with a BMI of Body mass index is 51.72 kg/m²..    Patient has experienced weight regain since last seen in the office. She presents today to discuss restarting GLP weight loss medication to meet her overall goal.    Any history of type 2 diabetes mellitus? Denies   If yes, any current GLP-1 receptor agonists or sulfonylureas? (must consider reducing dose of insulin or sulfonylureas by 1/2 to reduce risk of hypoglycemia).    Any current renal impairment? Denies    Any current hepatic impairment? Denies    Any history of pancreatitis? Denies    Any history of gastroparesis? Denies   (Saxenda has not been studied in patients with pre-existing gastroparesis).    Any history of personal or family medullary thyroid cancinoma (MTC) or multiple endocrine neoplasia type 2 (MEN 2)? Denies (black box warning).    Any current or history suicidal attempts or active suicidal ideation? Denies   (avoid in these patients)    Current weight: 325 pounds    Diet plan: eating once a day; occasional stress eater; not tracking calories    Exercise: limited    I have reviewed the patient's(pertinent information to this visit) medical history, family history(scanned in  the Mediatab under \"patient questioner\"), social history and review of systems with the patient today in the office.          Past Surgical History:   Procedure Laterality Date    ARM SURGERY Right     TONSILLECTOMY         Past Medical History:   Diagnosis Date    Bacterial meningitis     COVID-19     Depression        History reviewed. No pertinent family history.    Social History     Socioeconomic History    Marital status:      Spouse name: Not on file    Number of children: Not on file    Years of education: Not on file    Highest education

## 2025-03-10 ENCOUNTER — OFFICE VISIT (OUTPATIENT)
Dept: BARIATRICS/WEIGHT MGMT | Age: 40
End: 2025-03-10
Payer: COMMERCIAL

## 2025-03-10 VITALS
SYSTOLIC BLOOD PRESSURE: 108 MMHG | WEIGHT: 293 LBS | DIASTOLIC BLOOD PRESSURE: 80 MMHG | OXYGEN SATURATION: 98 % | HEART RATE: 84 BPM | BODY MASS INDEX: 45.99 KG/M2 | HEIGHT: 67 IN

## 2025-03-10 DIAGNOSIS — Z79.899 MEDICATION MANAGEMENT: ICD-10-CM

## 2025-03-10 DIAGNOSIS — E66.01 MORBID OBESITY WITH BMI OF 45.0-49.9, ADULT: Primary | ICD-10-CM

## 2025-03-10 PROCEDURE — G8427 DOCREV CUR MEDS BY ELIG CLIN: HCPCS | Performed by: NURSE PRACTITIONER

## 2025-03-10 PROCEDURE — G8417 CALC BMI ABV UP PARAM F/U: HCPCS | Performed by: NURSE PRACTITIONER

## 2025-03-10 PROCEDURE — 1036F TOBACCO NON-USER: CPT | Performed by: NURSE PRACTITIONER

## 2025-03-10 PROCEDURE — 99214 OFFICE O/P EST MOD 30 MIN: CPT | Performed by: NURSE PRACTITIONER

## 2025-03-10 RX ORDER — TIRZEPATIDE 5 MG/.5ML
5 INJECTION, SOLUTION SUBCUTANEOUS WEEKLY
Qty: 0.5 ML | Refills: 1 | Status: SHIPPED | OUTPATIENT
Start: 2025-03-10

## 2025-03-10 ASSESSMENT — ENCOUNTER SYMPTOMS: CONSTIPATION: 1

## 2025-03-10 NOTE — PROGRESS NOTES
Chief Complaint   Patient presents with    Weight Management     Med WM Zepbound         SUBJECTIVE:    HPI: Patient is here with complaints of weight management.    Obesity with a BMI of Body mass index is 49.33 kg/m²..    Any current renal impairment? Denies    Any current hepatic impairment? Denies    Any history of pancreatitis? Denies    Any history of gastroparesis? Denies   (Saxenda has not been studied in patients with pre-existing gastroparesis).    Any history of personal or family medullary thyroid cancinoma (MTC) or multiple endocrine neoplasia type 2 (MEN 2)? Denies (black box warning).    Any current or history suicidal attempts or active suicidal ideation? Denies   (avoid in these patients)    Current weight: 310 pounds    Weight loss of -15  pounds    Diet plan: calorie awareness; water and protein intake improved; carb intake decreased    Exercise: increased walking    I have reviewed the patient's(pertinent information to this visit) medical history, family history(scanned in  the Mediatab under \"patient questioner\"), social history and review of systems with the patient today in the office.          Past Surgical History:   Procedure Laterality Date    ARM SURGERY Right     TONSILLECTOMY         Past Medical History:   Diagnosis Date    Bacterial meningitis     COVID-19     Depression        No family history on file.    Social History     Socioeconomic History    Marital status:      Spouse name: Not on file    Number of children: Not on file    Years of education: Not on file    Highest education level: Not on file   Occupational History    Not on file   Tobacco Use    Smoking status: Never    Smokeless tobacco: Never   Vaping Use    Vaping status: Former   Substance and Sexual Activity    Alcohol use: Yes     Comment: Rarely    Drug use: No    Sexual activity: Not on file   Other Topics Concern    Not on file   Social History Narrative    Not on file     Social Drivers of Health

## 2025-04-09 ENCOUNTER — OFFICE VISIT (OUTPATIENT)
Dept: BARIATRICS/WEIGHT MGMT | Age: 40
End: 2025-04-09
Payer: COMMERCIAL

## 2025-04-09 VITALS
WEIGHT: 293 LBS | HEIGHT: 67 IN | OXYGEN SATURATION: 97 % | BODY MASS INDEX: 45.99 KG/M2 | SYSTOLIC BLOOD PRESSURE: 120 MMHG | DIASTOLIC BLOOD PRESSURE: 88 MMHG | HEART RATE: 92 BPM

## 2025-04-09 DIAGNOSIS — Z79.899 MEDICATION MANAGEMENT: ICD-10-CM

## 2025-04-09 DIAGNOSIS — E66.01 MORBID OBESITY WITH BMI OF 45.0-49.9, ADULT: Primary | ICD-10-CM

## 2025-04-09 PROCEDURE — G8427 DOCREV CUR MEDS BY ELIG CLIN: HCPCS | Performed by: NURSE PRACTITIONER

## 2025-04-09 PROCEDURE — 99214 OFFICE O/P EST MOD 30 MIN: CPT | Performed by: NURSE PRACTITIONER

## 2025-04-09 PROCEDURE — 1036F TOBACCO NON-USER: CPT | Performed by: NURSE PRACTITIONER

## 2025-04-09 PROCEDURE — G8417 CALC BMI ABV UP PARAM F/U: HCPCS | Performed by: NURSE PRACTITIONER

## 2025-04-09 RX ORDER — TIRZEPATIDE 5 MG/.5ML
5 INJECTION, SOLUTION SUBCUTANEOUS WEEKLY
Qty: 0.5 ML | Refills: 1 | Status: SHIPPED | OUTPATIENT
Start: 2025-04-09

## 2025-04-09 ASSESSMENT — ENCOUNTER SYMPTOMS: CONSTIPATION: 1

## 2025-04-09 NOTE — PROGRESS NOTES
Chief Complaint   Patient presents with    Weight Management     F/U Medication  Visit - Zepbound  Has Bar Cov         SUBJECTIVE:    HPI: Patient is here with complaints of weight management.    Obesity with a BMI of Body mass index is 47.87 kg/m²..    Any current renal impairment? Denies    Any current hepatic impairment? Denies    Any history of pancreatitis? Denies    Any history of gastroparesis? Denies   (Saxenda has not been studied in patients with pre-existing gastroparesis).    Any history of personal or family medullary thyroid cancinoma (MTC) or multiple endocrine neoplasia type 2 (MEN 2)? Denies (black box warning).    Any current or history suicidal attempts or active suicidal ideation? Denies   (avoid in these patients)    Current weight: 301 pounds    Weight loss of  9.2   pounds    Diet plan: tracking calories about 1300 daily; protein and water intake good most days    Exercise: limited    I have reviewed the patient's(pertinent information to this visit) medical history, family history(scanned in  the Mediatab under \"patient questioner\"), social history and review of systems with the patient today in the office.          Past Surgical History:   Procedure Laterality Date    ARM SURGERY Right     TONSILLECTOMY         Past Medical History:   Diagnosis Date    Bacterial meningitis     COVID-19     Depression        History reviewed. No pertinent family history.    Social History     Socioeconomic History    Marital status:      Spouse name: Not on file    Number of children: Not on file    Years of education: Not on file    Highest education level: Not on file   Occupational History    Not on file   Tobacco Use    Smoking status: Never    Smokeless tobacco: Never   Vaping Use    Vaping status: Former   Substance and Sexual Activity    Alcohol use: Yes     Comment: Rarely    Drug use: No    Sexual activity: Not on file   Other Topics Concern    Not on file   Social History Narrative    Not

## 2025-05-07 ENCOUNTER — OFFICE VISIT (OUTPATIENT)
Dept: BARIATRICS/WEIGHT MGMT | Age: 40
End: 2025-05-07
Payer: COMMERCIAL

## 2025-05-07 VITALS
HEIGHT: 67 IN | WEIGHT: 293 LBS | BODY MASS INDEX: 45.99 KG/M2 | DIASTOLIC BLOOD PRESSURE: 76 MMHG | SYSTOLIC BLOOD PRESSURE: 112 MMHG | OXYGEN SATURATION: 97 % | HEART RATE: 95 BPM

## 2025-05-07 DIAGNOSIS — Z79.899 MEDICATION MANAGEMENT: ICD-10-CM

## 2025-05-07 DIAGNOSIS — E66.01 MORBID OBESITY WITH BMI OF 45.0-49.9, ADULT (HCC): Primary | ICD-10-CM

## 2025-05-07 PROCEDURE — G8427 DOCREV CUR MEDS BY ELIG CLIN: HCPCS | Performed by: NURSE PRACTITIONER

## 2025-05-07 PROCEDURE — 99214 OFFICE O/P EST MOD 30 MIN: CPT | Performed by: NURSE PRACTITIONER

## 2025-05-07 PROCEDURE — G8417 CALC BMI ABV UP PARAM F/U: HCPCS | Performed by: NURSE PRACTITIONER

## 2025-05-07 PROCEDURE — 1036F TOBACCO NON-USER: CPT | Performed by: NURSE PRACTITIONER

## 2025-05-07 RX ORDER — TIRZEPATIDE 7.5 MG/.5ML
7.5 INJECTION, SOLUTION SUBCUTANEOUS WEEKLY
Qty: 0.5 ML | Refills: 1 | Status: SHIPPED | OUTPATIENT
Start: 2025-05-07

## 2025-05-07 NOTE — PROGRESS NOTES
Behavior: Behavior normal.           ASSESSMENT and PLAN:  1. Morbid obesity with BMI of 45.0-49.9, adult (HCC)  - Be consistent with all caloric intake  - Calorie goal about 6214-5227 daily  - Daily carb intake goal 60-70 gms   - Daily protein goal 60-80 gms daily  - Eat 5-6 small meals/ snacks throughout the day  - 64 oz water intake daily  - Increase activity as tolerated    2. Medication management  - Weight gain of 2.4 pounds   - Denies any side effects  - Will increase  Zepbound to 7.5 mg weekly dose   - RX sent to pharmacy  - Take as directed  - Call for any concerns  - RTC one month        -BMI is over 30, or over 27 with weight-related co-morbidities / risk factors (hypertension, type 2 diabetes, dyslipidemia).    -Must be seen monthly in office in order to have dose escalation prescribed.     -Remainder of dosing escalation: 5 mg SC weekly for 28 days; 7.5 mg SC weekly for 28 days; 10 mg SC weekly for 28 days;  12.5 mg SC weekly for 28 days; and last dose escalation is 15 mg SC weekly there after.    -Will evaluate change in body 16 weeks after initiating Zepbound. Will be discontinued if pt has not lost at least 4% of baseline body weight.     -Discussed with pt in order to be successful, must use Zepbound as an adjunct to diet and exercise.     -Pt provided with medication handout that covers warning, uses, how to use, side effects, precautions, drug interactions, overdose, notes, missed dose, storage, medical alert, and how to use this information.     - Pt educated on proper technique to clean abdomen, proper dialing of medicine if applicable, inject medication subcutaneously, and rotation of injection sites.    -Pt aware must meet monthly in person while on this medication.    -If pt develops symptoms of acute pancreatitis,  acute gallbladder disease,  or changes in behavior, staff  will order appropriate studies and follow-up accordingly.    - Pt aware pregnancy and breast feeding contraindicated

## 2025-06-09 ENCOUNTER — OFFICE VISIT (OUTPATIENT)
Dept: BARIATRICS/WEIGHT MGMT | Age: 40
End: 2025-06-09
Payer: COMMERCIAL

## 2025-06-09 VITALS
HEART RATE: 94 BPM | BODY MASS INDEX: 45.71 KG/M2 | WEIGHT: 291.2 LBS | OXYGEN SATURATION: 97 % | HEIGHT: 67 IN | SYSTOLIC BLOOD PRESSURE: 124 MMHG | DIASTOLIC BLOOD PRESSURE: 80 MMHG

## 2025-06-09 DIAGNOSIS — E66.01 MORBID OBESITY WITH BMI OF 45.0-49.9, ADULT (HCC): Primary | ICD-10-CM

## 2025-06-09 DIAGNOSIS — Z79.899 MEDICATION MANAGEMENT: ICD-10-CM

## 2025-06-09 PROCEDURE — G8427 DOCREV CUR MEDS BY ELIG CLIN: HCPCS | Performed by: NURSE PRACTITIONER

## 2025-06-09 PROCEDURE — 99214 OFFICE O/P EST MOD 30 MIN: CPT | Performed by: NURSE PRACTITIONER

## 2025-06-09 PROCEDURE — 1036F TOBACCO NON-USER: CPT | Performed by: NURSE PRACTITIONER

## 2025-06-09 PROCEDURE — G8417 CALC BMI ABV UP PARAM F/U: HCPCS | Performed by: NURSE PRACTITIONER

## 2025-06-09 RX ORDER — TIRZEPATIDE 7.5 MG/.5ML
7.5 INJECTION, SOLUTION SUBCUTANEOUS WEEKLY
Qty: 0.5 ML | Refills: 1 | Status: SHIPPED | OUTPATIENT
Start: 2025-06-09

## 2025-06-09 NOTE — PROGRESS NOTES
Chief Complaint   Patient presents with    Weight Management     Medication  Visit - Zepbound             SUBJECTIVE:    HPI: Patient is here with complaints of weight management. Monthly Zepbound appointment.    Obesity with a BMI of Body mass index is 46.3 kg/m²..    Any current renal impairment? Denies    Any current hepatic impairment? Denies    Any history of pancreatitis? Denies    Any history of gastroparesis? Denies   (Saxenda has not been studied in patients with pre-existing gastroparesis).    Any history of personal or family medullary thyroid cancinoma (MTC) or multiple endocrine neoplasia type 2 (MEN 2)? Denies (black box warning).    Any current or history suicidal attempts or active suicidal ideation? Denies   (avoid in these patients)    Current weight: 291 pounds    Weight loss of  -12.3   pounds    Diet plan: tracking calories with weight watchers; protein around 100 gms; carb intake low; water intake varies    Exercise: stays active    I have reviewed the patient's(pertinent information to this visit) medical history, family history(scanned in  the Mediatab under \"patient questioner\"), social history and review of systems with the patient today in the office.          Past Surgical History:   Procedure Laterality Date    ARM SURGERY Right     TONSILLECTOMY         Past Medical History:   Diagnosis Date    Bacterial meningitis     COVID-19     Depression        No family history on file.    Social History     Socioeconomic History    Marital status:      Spouse name: Not on file    Number of children: Not on file    Years of education: Not on file    Highest education level: Not on file   Occupational History    Not on file   Tobacco Use    Smoking status: Never    Smokeless tobacco: Never   Vaping Use    Vaping status: Former   Substance and Sexual Activity    Alcohol use: Yes     Comment: Rarely    Drug use: No    Sexual activity: Not on file   Other Topics Concern    Not on file

## 2025-08-04 ENCOUNTER — OFFICE VISIT (OUTPATIENT)
Dept: BARIATRICS/WEIGHT MGMT | Age: 40
End: 2025-08-04
Payer: COMMERCIAL

## 2025-08-04 VITALS
DIASTOLIC BLOOD PRESSURE: 84 MMHG | HEART RATE: 88 BPM | OXYGEN SATURATION: 98 % | BODY MASS INDEX: 45.99 KG/M2 | SYSTOLIC BLOOD PRESSURE: 126 MMHG | HEIGHT: 67 IN | WEIGHT: 293 LBS

## 2025-08-04 DIAGNOSIS — Z79.899 MEDICATION MANAGEMENT: ICD-10-CM

## 2025-08-04 DIAGNOSIS — E66.01 MORBID OBESITY WITH BMI OF 45.0-49.9, ADULT (HCC): Primary | ICD-10-CM

## 2025-08-04 PROCEDURE — 99214 OFFICE O/P EST MOD 30 MIN: CPT | Performed by: NURSE PRACTITIONER

## 2025-08-04 PROCEDURE — G8427 DOCREV CUR MEDS BY ELIG CLIN: HCPCS | Performed by: NURSE PRACTITIONER

## 2025-08-04 PROCEDURE — 1036F TOBACCO NON-USER: CPT | Performed by: NURSE PRACTITIONER

## 2025-08-04 PROCEDURE — G8417 CALC BMI ABV UP PARAM F/U: HCPCS | Performed by: NURSE PRACTITIONER

## 2025-08-04 RX ORDER — TIRZEPATIDE 10 MG/.5ML
10 INJECTION, SOLUTION SUBCUTANEOUS WEEKLY
Qty: 0.5 ML | Refills: 1 | Status: SHIPPED | OUTPATIENT
Start: 2025-08-04

## 2025-08-04 RX ORDER — TIRZEPATIDE 10 MG/.5ML
10 INJECTION, SOLUTION SUBCUTANEOUS WEEKLY
Qty: 0.5 ML | Refills: 1 | Status: SHIPPED | OUTPATIENT
Start: 2025-08-04 | End: 2025-08-04 | Stop reason: CLARIF